# Patient Record
Sex: MALE | Race: WHITE | NOT HISPANIC OR LATINO
[De-identification: names, ages, dates, MRNs, and addresses within clinical notes are randomized per-mention and may not be internally consistent; named-entity substitution may affect disease eponyms.]

---

## 2024-03-28 PROBLEM — Z00.00 ENCOUNTER FOR PREVENTIVE HEALTH EXAMINATION: Status: ACTIVE | Noted: 2024-03-28

## 2024-04-26 PROBLEM — Z87.19 HISTORY OF DIVERTICULITIS OF COLON: Status: RESOLVED | Noted: 2024-04-26 | Resolved: 2024-04-26

## 2024-04-26 PROBLEM — Z86.79 HISTORY OF SUBDURAL HEMORRHAGE: Status: RESOLVED | Noted: 2024-04-26 | Resolved: 2024-04-26

## 2024-04-29 ENCOUNTER — APPOINTMENT (OUTPATIENT)
Dept: COLORECTAL SURGERY | Facility: CLINIC | Age: 69
End: 2024-04-29
Payer: MEDICARE

## 2024-04-29 VITALS
RESPIRATION RATE: 4 BRPM | HEIGHT: 77 IN | BODY MASS INDEX: 25.74 KG/M2 | OXYGEN SATURATION: 100 % | WEIGHT: 218 LBS | DIASTOLIC BLOOD PRESSURE: 86 MMHG | SYSTOLIC BLOOD PRESSURE: 132 MMHG | HEART RATE: 67 BPM

## 2024-04-29 DIAGNOSIS — Z87.19 PERSONAL HISTORY OF OTHER DISEASES OF THE DIGESTIVE SYSTEM: ICD-10-CM

## 2024-04-29 DIAGNOSIS — Z86.79 PERSONAL HISTORY OF OTHER DISEASES OF THE CIRCULATORY SYSTEM: ICD-10-CM

## 2024-04-29 PROCEDURE — 99203 OFFICE O/P NEW LOW 30 MIN: CPT

## 2024-04-29 NOTE — DATA REVIEWED
[FreeTextEntry1] : CT abd/pelvis 3/1/2024 (pg10) s/p left lower quadrant colostomy with creation of a rectosigmoid pouch.  a 2x1 cm thick walled fluid collection, presumably an abscess, posterior to the left abdominal wall has decreased in size, previously 4.2 x 1.1 cm. The previously seen abscess associated with the left inguinal canal extending to the hemiscrotum is no longer present.  Impression: Interval improvement in abscess posterior to the lower left abdominal wall and left inguinal region and hemiscrotum as described.  Pathology 12/16/2023 Portion of colon with diverticulosis and acute serositis c/w perforation

## 2024-04-29 NOTE — PHYSICAL EXAM
[Alert] : alert [Oriented to Person] : oriented to person [Oriented to Place] : oriented to place [Oriented to Time] : oriented to time [de-identified] : large midline incision + LLQ colostomy [de-identified] : WDWN male, NAD [de-identified] : NC/AT [facility] [de-identified] : + left scar; testicle elevated [de-identified] : No C/C/E

## 2024-04-29 NOTE — HISTORY OF PRESENT ILLNESS
[FreeTextEntry1] : 69 y.o male from CT with hx of diverticulitis s/p sigmoid resection on 12/16/2023 with colostomy. Hx of subdural hemorrage 4/2023 (had a fall during pickleball).  He had a b/l inguinal hernia repair; He repeatedly went to the ER. After  He was septic shock and needed emergency surgery.  He did have mesh placed during the inguinal hernia repair. He lost 30lbs during his 49 days in the hospital.

## 2024-04-29 NOTE — ASSESSMENT
[FreeTextEntry1] : 69 y.o male with hx of diverticulitis  we need operative report from prior surgery advised to have clear expectations from urologist - about surgery. Will need ureter stents and possible; plastics consultation due to  5-7 days in the hospital 6-8 weeks recovery epidural for pain management  As stated above this is a complex problem likely injury to the bowel at the time of the laparoscopic hernia repair.  We will  a date that is convenient for him and for us to do the Orozco's takedown with likely diverting loop ileostomy and ureteric stents.  He is really worried about his sexual dysfunction and the scar tissue causing this and the surgery was recommended to him to relieve the scar tissue so the left testicle can go down to the scrotum where previous I&D was done.  This is really up to him since I have not heard this type of approach before at the same time,  I am not an expert about this topic.  I strongly urged him to get an opinion from somebody who deals with erectile dysfunction or infertility related issues as a urologist and will try to find somebody here for that purpose.  Spent over 40 minutes where all risk benefits morbidity complications were explained at the time of the surgery which we plan to do sometime in June or July and he consents for the procedure.  Wife was present and I allowed myself to be recorded at the time of this visit thank you so much

## 2024-06-18 NOTE — DATA REVIEWED
[FreeTextEntry1] : CT abd/pelvis 3/1/2024 (pg10) s/p left lower quadrant colostomy with creation of a rectosigmoid pouch.  a 2x1 cm thick walled fluid collection, presumably an abscess, posterior to the left abdominal wall has decreased in size, previously 4.2 x 1.1 cm. The previously seen abscess associated with the left inguinal canal extending to the hemiscrotum is no longer present.  Impression: Interval improvement in abscess posterior to the lower left abdominal wall and left inguinal region and hemiscrotum as described.  Pathology 12/16/2023 Portion of colon with diverticulosis and acute serositis c/w perforation.

## 2024-06-18 NOTE — PHYSICAL EXAM
[No Rash or Lesion] : No rash or lesion [Alert] : alert [Oriented to Person] : oriented to person [Oriented to Place] : oriented to place [Oriented to Time] : oriented to time [de-identified] : WDWEFE [de-identified] : NC/AT, anicteric [de-identified] : no C/C/E

## 2024-06-19 ENCOUNTER — APPOINTMENT (OUTPATIENT)
Dept: UROLOGY | Facility: CLINIC | Age: 69
End: 2024-06-19
Payer: MEDICARE

## 2024-06-19 VITALS
TEMPERATURE: 97.3 F | HEART RATE: 55 BPM | BODY MASS INDEX: 25.74 KG/M2 | WEIGHT: 218 LBS | SYSTOLIC BLOOD PRESSURE: 128 MMHG | OXYGEN SATURATION: 100 % | RESPIRATION RATE: 14 BRPM | HEIGHT: 77 IN | DIASTOLIC BLOOD PRESSURE: 79 MMHG

## 2024-06-19 DIAGNOSIS — K57.32 DIVERTICULITIS OF LARGE INTESTINE W/OUT PERFORATION OR ABSCESS W/OUT BLEEDING: ICD-10-CM

## 2024-06-19 DIAGNOSIS — K91.858 OTHER COMPLICATIONS OF INTESTINAL POUCH: ICD-10-CM

## 2024-06-19 DIAGNOSIS — Z43.2 ENCOUNTER FOR ATTENTION TO ILEOSTOMY: ICD-10-CM

## 2024-06-19 PROCEDURE — 99203 OFFICE O/P NEW LOW 30 MIN: CPT

## 2024-06-20 ENCOUNTER — APPOINTMENT (OUTPATIENT)
Dept: COLORECTAL SURGERY | Facility: CLINIC | Age: 69
End: 2024-06-20

## 2024-06-24 ENCOUNTER — OUTPATIENT (OUTPATIENT)
Dept: OUTPATIENT SERVICES | Facility: HOSPITAL | Age: 69
LOS: 1 days | End: 2024-06-24
Payer: MEDICARE

## 2024-06-24 ENCOUNTER — APPOINTMENT (OUTPATIENT)
Dept: COLORECTAL SURGERY | Facility: CLINIC | Age: 69
End: 2024-06-24
Payer: MEDICARE

## 2024-06-24 VITALS
RESPIRATION RATE: 14 BRPM | DIASTOLIC BLOOD PRESSURE: 78 MMHG | SYSTOLIC BLOOD PRESSURE: 134 MMHG | OXYGEN SATURATION: 98 % | WEIGHT: 218 LBS | HEIGHT: 77 IN | HEART RATE: 65 BPM | BODY MASS INDEX: 25.74 KG/M2

## 2024-06-24 VITALS
SYSTOLIC BLOOD PRESSURE: 127 MMHG | TEMPERATURE: 98 F | OXYGEN SATURATION: 100 % | HEIGHT: 77 IN | RESPIRATION RATE: 18 BRPM | DIASTOLIC BLOOD PRESSURE: 81 MMHG | HEART RATE: 61 BPM | WEIGHT: 224.65 LBS

## 2024-06-24 DIAGNOSIS — Z29.9 ENCOUNTER FOR PROPHYLACTIC MEASURES, UNSPECIFIED: ICD-10-CM

## 2024-06-24 DIAGNOSIS — Z96.643 PRESENCE OF ARTIFICIAL HIP JOINT, BILATERAL: Chronic | ICD-10-CM

## 2024-06-24 DIAGNOSIS — Z90.49 ACQUIRED ABSENCE OF OTHER SPECIFIED PARTS OF DIGESTIVE TRACT: Chronic | ICD-10-CM

## 2024-06-24 DIAGNOSIS — K91.858 OTHER COMPLICATIONS OF INTESTINAL POUCH: ICD-10-CM

## 2024-06-24 DIAGNOSIS — Z90.89 ACQUIRED ABSENCE OF OTHER ORGANS: Chronic | ICD-10-CM

## 2024-06-24 DIAGNOSIS — Z93.3 COLOSTOMY STATUS: Chronic | ICD-10-CM

## 2024-06-24 DIAGNOSIS — Z98.890 OTHER SPECIFIED POSTPROCEDURAL STATES: Chronic | ICD-10-CM

## 2024-06-24 DIAGNOSIS — Z01.818 ENCOUNTER FOR OTHER PREPROCEDURAL EXAMINATION: ICD-10-CM

## 2024-06-24 LAB
ANION GAP SERPL CALC-SCNC: 11 MMOL/L — SIGNIFICANT CHANGE UP (ref 5–17)
BLD GP AB SCN SERPL QL: NEGATIVE — SIGNIFICANT CHANGE UP
BUN SERPL-MCNC: 20 MG/DL — SIGNIFICANT CHANGE UP (ref 7–23)
CALCIUM SERPL-MCNC: 10.2 MG/DL — SIGNIFICANT CHANGE UP (ref 8.4–10.5)
CHLORIDE SERPL-SCNC: 105 MMOL/L — SIGNIFICANT CHANGE UP (ref 96–108)
CO2 SERPL-SCNC: 24 MMOL/L — SIGNIFICANT CHANGE UP (ref 22–31)
CREAT SERPL-MCNC: 1.22 MG/DL — SIGNIFICANT CHANGE UP (ref 0.5–1.3)
EGFR: 64 ML/MIN/1.73M2 — SIGNIFICANT CHANGE UP
GLUCOSE SERPL-MCNC: 108 MG/DL — HIGH (ref 70–99)
HCT VFR BLD CALC: 42.4 % — SIGNIFICANT CHANGE UP (ref 39–50)
HGB BLD-MCNC: 14 G/DL — SIGNIFICANT CHANGE UP (ref 13–17)
MCHC RBC-ENTMCNC: 30.4 PG — SIGNIFICANT CHANGE UP (ref 27–34)
MCHC RBC-ENTMCNC: 33 GM/DL — SIGNIFICANT CHANGE UP (ref 32–36)
MCV RBC AUTO: 92.2 FL — SIGNIFICANT CHANGE UP (ref 80–100)
NRBC # BLD: 0 /100 WBCS — SIGNIFICANT CHANGE UP (ref 0–0)
PLATELET # BLD AUTO: 247 K/UL — SIGNIFICANT CHANGE UP (ref 150–400)
POTASSIUM SERPL-MCNC: 4.5 MMOL/L — SIGNIFICANT CHANGE UP (ref 3.5–5.3)
POTASSIUM SERPL-SCNC: 4.5 MMOL/L — SIGNIFICANT CHANGE UP (ref 3.5–5.3)
RBC # BLD: 4.6 M/UL — SIGNIFICANT CHANGE UP (ref 4.2–5.8)
RBC # FLD: 12.6 % — SIGNIFICANT CHANGE UP (ref 10.3–14.5)
RH IG SCN BLD-IMP: POSITIVE — SIGNIFICANT CHANGE UP
SODIUM SERPL-SCNC: 140 MMOL/L — SIGNIFICANT CHANGE UP (ref 135–145)
WBC # BLD: 7.51 K/UL — SIGNIFICANT CHANGE UP (ref 3.8–10.5)
WBC # FLD AUTO: 7.51 K/UL — SIGNIFICANT CHANGE UP (ref 3.8–10.5)

## 2024-06-24 PROCEDURE — 99214 OFFICE O/P EST MOD 30 MIN: CPT

## 2024-06-24 RX ORDER — CEFOTETAN DISODIUM 2 G
2 VIAL (EA) INJECTION ONCE
Refills: 0 | Status: DISCONTINUED | OUTPATIENT
Start: 2024-06-25 | End: 2024-06-26

## 2024-06-25 ENCOUNTER — APPOINTMENT (OUTPATIENT)
Dept: UROLOGY | Facility: HOSPITAL | Age: 69
End: 2024-06-25

## 2024-06-25 ENCOUNTER — APPOINTMENT (OUTPATIENT)
Dept: COLORECTAL SURGERY | Facility: HOSPITAL | Age: 69
End: 2024-06-25

## 2024-06-25 ENCOUNTER — INPATIENT (INPATIENT)
Facility: HOSPITAL | Age: 69
LOS: 5 days | Discharge: HOME HEALTH | DRG: 395 | End: 2024-07-01
Attending: COLON & RECTAL SURGERY | Admitting: COLON & RECTAL SURGERY
Payer: MEDICARE

## 2024-06-25 VITALS
TEMPERATURE: 98 F | SYSTOLIC BLOOD PRESSURE: 122 MMHG | OXYGEN SATURATION: 98 % | HEIGHT: 77.01 IN | DIASTOLIC BLOOD PRESSURE: 74 MMHG | WEIGHT: 224.65 LBS | HEART RATE: 68 BPM | RESPIRATION RATE: 16 BRPM

## 2024-06-25 DIAGNOSIS — K91.858 OTHER COMPLICATIONS OF INTESTINAL POUCH: ICD-10-CM

## 2024-06-25 DIAGNOSIS — Z93.3 COLOSTOMY STATUS: Chronic | ICD-10-CM

## 2024-06-25 DIAGNOSIS — Z90.89 ACQUIRED ABSENCE OF OTHER ORGANS: Chronic | ICD-10-CM

## 2024-06-25 DIAGNOSIS — Z90.49 ACQUIRED ABSENCE OF OTHER SPECIFIED PARTS OF DIGESTIVE TRACT: Chronic | ICD-10-CM

## 2024-06-25 DIAGNOSIS — Z98.890 OTHER SPECIFIED POSTPROCEDURAL STATES: Chronic | ICD-10-CM

## 2024-06-25 DIAGNOSIS — Z96.643 PRESENCE OF ARTIFICIAL HIP JOINT, BILATERAL: Chronic | ICD-10-CM

## 2024-06-25 LAB
ADD ON TEST-SPECIMEN IN LAB: SIGNIFICANT CHANGE UP
ANION GAP SERPL CALC-SCNC: 15 MMOL/L — SIGNIFICANT CHANGE UP (ref 5–17)
BUN SERPL-MCNC: 19 MG/DL — SIGNIFICANT CHANGE UP (ref 7–23)
CALCIUM SERPL-MCNC: 9.7 MG/DL — SIGNIFICANT CHANGE UP (ref 8.4–10.5)
CHLORIDE SERPL-SCNC: 105 MMOL/L — SIGNIFICANT CHANGE UP (ref 96–108)
CO2 SERPL-SCNC: 18 MMOL/L — LOW (ref 22–31)
CREAT SERPL-MCNC: 1.11 MG/DL — SIGNIFICANT CHANGE UP (ref 0.5–1.3)
EGFR: 71 ML/MIN/1.73M2 — SIGNIFICANT CHANGE UP
GAS PNL BLDA: SIGNIFICANT CHANGE UP
GLUCOSE SERPL-MCNC: 159 MG/DL — HIGH (ref 70–99)
HCT VFR BLD CALC: 36.3 % — LOW (ref 39–50)
HGB BLD-MCNC: 12.4 G/DL — LOW (ref 13–17)
MAGNESIUM SERPL-MCNC: 1.7 MG/DL — SIGNIFICANT CHANGE UP (ref 1.6–2.6)
MCHC RBC-ENTMCNC: 31 PG — SIGNIFICANT CHANGE UP (ref 27–34)
MCHC RBC-ENTMCNC: 34.2 GM/DL — SIGNIFICANT CHANGE UP (ref 32–36)
MCV RBC AUTO: 90.8 FL — SIGNIFICANT CHANGE UP (ref 80–100)
NRBC # BLD: 0 /100 WBCS — SIGNIFICANT CHANGE UP (ref 0–0)
PHOSPHATE SERPL-MCNC: 4.3 MG/DL — SIGNIFICANT CHANGE UP (ref 2.5–4.5)
PLATELET # BLD AUTO: 212 K/UL — SIGNIFICANT CHANGE UP (ref 150–400)
POTASSIUM SERPL-MCNC: 4 MMOL/L — SIGNIFICANT CHANGE UP (ref 3.5–5.3)
POTASSIUM SERPL-SCNC: 4 MMOL/L — SIGNIFICANT CHANGE UP (ref 3.5–5.3)
RBC # BLD: 4 M/UL — LOW (ref 4.2–5.8)
RBC # FLD: 12.5 % — SIGNIFICANT CHANGE UP (ref 10.3–14.5)
SODIUM SERPL-SCNC: 138 MMOL/L — SIGNIFICANT CHANGE UP (ref 135–145)
WBC # BLD: 12.12 K/UL — HIGH (ref 3.8–10.5)
WBC # FLD AUTO: 12.12 K/UL — HIGH (ref 3.8–10.5)

## 2024-06-25 PROCEDURE — 45330 DIAGNOSTIC SIGMOIDOSCOPY: CPT

## 2024-06-25 PROCEDURE — 44310 ILEOSTOMY/JEJUNOSTOMY: CPT

## 2024-06-25 PROCEDURE — 44626 REPAIR BOWEL OPENING: CPT | Mod: 22

## 2024-06-25 PROCEDURE — 52005 CYSTO W/URTRL CATHJ: CPT

## 2024-06-25 PROCEDURE — 88307 TISSUE EXAM BY PATHOLOGIST: CPT | Mod: 26

## 2024-06-25 DEVICE — GUIDEWIRE SENSOR DUAL-FLEX NITINOL STRAIGHT .035" X 150CM: Type: IMPLANTABLE DEVICE | Status: FUNCTIONAL

## 2024-06-25 DEVICE — STAPLER COVIDIEN TA 60 GREEN: Type: IMPLANTABLE DEVICE | Status: FUNCTIONAL

## 2024-06-25 DEVICE — URETERAL CATH FLEXIMA OPEN END 6FR 70CM: Type: IMPLANTABLE DEVICE | Status: FUNCTIONAL

## 2024-06-25 DEVICE — STAPLER ECHELON CIRCULAR POWERED 29MM: Type: IMPLANTABLE DEVICE | Status: FUNCTIONAL

## 2024-06-25 RX ORDER — ACETAMINOPHEN 325 MG
1000 TABLET ORAL EVERY 6 HOURS
Refills: 0 | Status: COMPLETED | OUTPATIENT
Start: 2024-06-25 | End: 2024-06-26

## 2024-06-25 RX ORDER — DEXTROSE MONOHYDRATE AND SODIUM CHLORIDE 5; .3 G/100ML; G/100ML
1000 INJECTION, SOLUTION INTRAVENOUS
Refills: 0 | Status: DISCONTINUED | OUTPATIENT
Start: 2024-06-25 | End: 2024-07-01

## 2024-06-25 RX ORDER — HYDROMORPHONE HCL 0.2 MG/ML
250 INJECTION, SOLUTION INTRAVENOUS
Refills: 0 | Status: DISCONTINUED | OUTPATIENT
Start: 2024-06-25 | End: 2024-06-25

## 2024-06-25 RX ORDER — ATORVASTATIN CALCIUM 20 MG/1
1 TABLET, FILM COATED ORAL
Refills: 0 | DISCHARGE

## 2024-06-25 RX ORDER — SODIUM CHLORIDE 0.9 % (FLUSH) 0.9 %
3 SYRINGE (ML) INJECTION EVERY 8 HOURS
Refills: 0 | Status: DISCONTINUED | OUTPATIENT
Start: 2024-06-25 | End: 2024-06-25

## 2024-06-25 RX ORDER — HYDROMORPHONE HCL 0.2 MG/ML
250 INJECTION, SOLUTION INTRAVENOUS
Refills: 0 | Status: DISCONTINUED | OUTPATIENT
Start: 2024-06-25 | End: 2024-06-30

## 2024-06-25 RX ORDER — ONDANSETRON HYDROCHLORIDE 2 MG/ML
4 INJECTION INTRAMUSCULAR; INTRAVENOUS EVERY 6 HOURS
Refills: 0 | Status: DISCONTINUED | OUTPATIENT
Start: 2024-06-25 | End: 2024-07-01

## 2024-06-25 RX ORDER — KETOROLAC TROMETHAMINE 30 MG/ML
30 INJECTION, SOLUTION INTRAMUSCULAR EVERY 8 HOURS
Refills: 0 | Status: DISCONTINUED | OUTPATIENT
Start: 2024-06-25 | End: 2024-06-25

## 2024-06-25 RX ORDER — NALOXONE HYDROCHLORIDE 1 MG/ML
0.1 INJECTION PARENTERAL
Refills: 0 | Status: DISCONTINUED | OUTPATIENT
Start: 2024-06-25 | End: 2024-06-30

## 2024-06-25 RX ORDER — LORAZEPAM 0.5 MG
1 TABLET ORAL
Refills: 0 | DISCHARGE

## 2024-06-25 RX ORDER — NALBUPHINE HCL 100 %
2.5 POWDER (GRAM) MISCELLANEOUS EVERY 6 HOURS
Refills: 0 | Status: DISCONTINUED | OUTPATIENT
Start: 2024-06-25 | End: 2024-06-30

## 2024-06-25 RX ORDER — HEPARIN SODIUM 50 [USP'U]/ML
5000 INJECTION, SOLUTION INTRAVENOUS EVERY 8 HOURS
Refills: 0 | Status: DISCONTINUED | OUTPATIENT
Start: 2024-06-25 | End: 2024-07-01

## 2024-06-25 RX ORDER — ZOLPIDEM TARTRATE 10 MG
1 TABLET ORAL
Refills: 0 | DISCHARGE

## 2024-06-25 RX ORDER — KETOROLAC TROMETHAMINE 30 MG/ML
15 INJECTION, SOLUTION INTRAMUSCULAR EVERY 6 HOURS
Refills: 0 | Status: DISCONTINUED | OUTPATIENT
Start: 2024-06-25 | End: 2024-06-26

## 2024-06-25 RX ORDER — PANTOPRAZOLE SODIUM 40 MG/10ML
40 INJECTION, POWDER, FOR SOLUTION INTRAVENOUS ONCE
Refills: 0 | Status: COMPLETED | OUTPATIENT
Start: 2024-06-25 | End: 2024-06-25

## 2024-06-25 RX ORDER — LIDOCAINE HYDROCHLORIDE 20 MG/ML
0.2 INJECTION, SOLUTION EPIDURAL; INFILTRATION; INTRACAUDAL; PERINEURAL ONCE
Refills: 0 | Status: DISCONTINUED | OUTPATIENT
Start: 2024-06-25 | End: 2024-06-25

## 2024-06-25 RX ORDER — HYDROMORPHONE HCL 0.2 MG/ML
5 INJECTION, SOLUTION INTRAVENOUS
Refills: 0 | Status: DISCONTINUED | OUTPATIENT
Start: 2024-06-25 | End: 2024-06-30

## 2024-06-25 RX ADMIN — HYDROMORPHONE HCL 5 MILLILITER(S): 0.2 INJECTION, SOLUTION INTRAVENOUS at 16:25

## 2024-06-25 RX ADMIN — DEXTROSE MONOHYDRATE AND SODIUM CHLORIDE 100 MILLILITER(S): 5; .3 INJECTION, SOLUTION INTRAVENOUS at 22:29

## 2024-06-25 RX ADMIN — PANTOPRAZOLE SODIUM 40 MILLIGRAM(S): 40 INJECTION, POWDER, FOR SOLUTION INTRAVENOUS at 16:52

## 2024-06-25 RX ADMIN — DEXTROSE MONOHYDRATE AND SODIUM CHLORIDE 100 MILLILITER(S): 5; .3 INJECTION, SOLUTION INTRAVENOUS at 16:15

## 2024-06-25 RX ADMIN — Medication 1000 MILLIGRAM(S): at 18:55

## 2024-06-25 RX ADMIN — HYDROMORPHONE HCL 5 MILLILITER(S): 0.2 INJECTION, SOLUTION INTRAVENOUS at 17:55

## 2024-06-25 RX ADMIN — HEPARIN SODIUM 5000 UNIT(S): 50 INJECTION, SOLUTION INTRAVENOUS at 22:29

## 2024-06-25 RX ADMIN — HYDROMORPHONE HCL 250 MILLILITER(S): 0.2 INJECTION, SOLUTION INTRAVENOUS at 19:16

## 2024-06-25 RX ADMIN — HYDROMORPHONE HCL 250 MILLILITER(S): 0.2 INJECTION, SOLUTION INTRAVENOUS at 18:00

## 2024-06-25 RX ADMIN — Medication 400 MILLIGRAM(S): at 18:25

## 2024-06-25 RX ADMIN — HYDROMORPHONE HCL 250 MILLILITER(S): 0.2 INJECTION, SOLUTION INTRAVENOUS at 19:46

## 2024-06-25 RX ADMIN — HYDROMORPHONE HCL 250 MILLILITER(S): 0.2 INJECTION, SOLUTION INTRAVENOUS at 15:15

## 2024-06-25 RX ADMIN — HYDROMORPHONE HCL 250 MILLILITER(S): 0.2 INJECTION, SOLUTION INTRAVENOUS at 20:51

## 2024-06-26 LAB
ANION GAP SERPL CALC-SCNC: 10 MMOL/L — SIGNIFICANT CHANGE UP (ref 5–17)
BUN SERPL-MCNC: 22 MG/DL — SIGNIFICANT CHANGE UP (ref 7–23)
CALCIUM SERPL-MCNC: 9.2 MG/DL — SIGNIFICANT CHANGE UP (ref 8.4–10.5)
CHLORIDE SERPL-SCNC: 102 MMOL/L — SIGNIFICANT CHANGE UP (ref 96–108)
CO2 SERPL-SCNC: 23 MMOL/L — SIGNIFICANT CHANGE UP (ref 22–31)
CREAT SERPL-MCNC: 1.25 MG/DL — SIGNIFICANT CHANGE UP (ref 0.5–1.3)
EGFR: 62 ML/MIN/1.73M2 — SIGNIFICANT CHANGE UP
GLUCOSE SERPL-MCNC: 126 MG/DL — HIGH (ref 70–99)
HCT VFR BLD CALC: 35.9 % — LOW (ref 39–50)
HGB BLD-MCNC: 12.4 G/DL — LOW (ref 13–17)
MAGNESIUM SERPL-MCNC: 1.8 MG/DL — SIGNIFICANT CHANGE UP (ref 1.6–2.6)
MCHC RBC-ENTMCNC: 31.3 PG — SIGNIFICANT CHANGE UP (ref 27–34)
MCHC RBC-ENTMCNC: 34.5 GM/DL — SIGNIFICANT CHANGE UP (ref 32–36)
MCV RBC AUTO: 90.7 FL — SIGNIFICANT CHANGE UP (ref 80–100)
NRBC # BLD: 0 /100 WBCS — SIGNIFICANT CHANGE UP (ref 0–0)
PHOSPHATE SERPL-MCNC: 3.6 MG/DL — SIGNIFICANT CHANGE UP (ref 2.5–4.5)
PLATELET # BLD AUTO: 206 K/UL — SIGNIFICANT CHANGE UP (ref 150–400)
POTASSIUM SERPL-MCNC: 5.1 MMOL/L — SIGNIFICANT CHANGE UP (ref 3.5–5.3)
POTASSIUM SERPL-SCNC: 5.1 MMOL/L — SIGNIFICANT CHANGE UP (ref 3.5–5.3)
RBC # BLD: 3.96 M/UL — LOW (ref 4.2–5.8)
RBC # FLD: 12.7 % — SIGNIFICANT CHANGE UP (ref 10.3–14.5)
SODIUM SERPL-SCNC: 135 MMOL/L — SIGNIFICANT CHANGE UP (ref 135–145)
WBC # BLD: 14.2 K/UL — HIGH (ref 3.8–10.5)
WBC # FLD AUTO: 14.2 K/UL — HIGH (ref 3.8–10.5)

## 2024-06-26 RX ORDER — KETOROLAC TROMETHAMINE 30 MG/ML
15 INJECTION, SOLUTION INTRAMUSCULAR EVERY 6 HOURS
Refills: 0 | Status: DISCONTINUED | OUTPATIENT
Start: 2024-06-26 | End: 2024-06-27

## 2024-06-26 RX ORDER — PIPERACILLIN SODIUM AND TAZOBACTAM SODIUM 3; .375 G/15ML; G/15ML
3.38 INJECTION, POWDER, LYOPHILIZED, FOR SOLUTION INTRAVENOUS ONCE
Refills: 0 | Status: COMPLETED | OUTPATIENT
Start: 2024-06-26 | End: 2024-06-26

## 2024-06-26 RX ORDER — CEFTRIAXONE SODIUM 500 MG
1000 VIAL (EA) INJECTION EVERY 24 HOURS
Refills: 0 | Status: DISCONTINUED | OUTPATIENT
Start: 2024-06-26 | End: 2024-06-26

## 2024-06-26 RX ORDER — METHOCARBAMOL 500 MG
1000 TABLET ORAL EVERY 8 HOURS
Refills: 0 | Status: COMPLETED | OUTPATIENT
Start: 2024-06-26 | End: 2024-06-29

## 2024-06-26 RX ORDER — PIPERACILLIN SODIUM AND TAZOBACTAM SODIUM 3; .375 G/15ML; G/15ML
3.38 INJECTION, POWDER, LYOPHILIZED, FOR SOLUTION INTRAVENOUS EVERY 8 HOURS
Refills: 0 | Status: COMPLETED | OUTPATIENT
Start: 2024-06-26 | End: 2024-06-29

## 2024-06-26 RX ORDER — ZOLPIDEM TARTRATE 10 MG
5 TABLET ORAL AT BEDTIME
Refills: 0 | Status: DISCONTINUED | OUTPATIENT
Start: 2024-06-26 | End: 2024-07-01

## 2024-06-26 RX ORDER — ACETAMINOPHEN 325 MG
1000 TABLET ORAL EVERY 6 HOURS
Refills: 0 | Status: COMPLETED | OUTPATIENT
Start: 2024-06-26 | End: 2024-06-27

## 2024-06-26 RX ADMIN — HYDROMORPHONE HCL 250 MILLILITER(S): 0.2 INJECTION, SOLUTION INTRAVENOUS at 20:53

## 2024-06-26 RX ADMIN — Medication 1000 MILLIGRAM(S): at 06:40

## 2024-06-26 RX ADMIN — KETOROLAC TROMETHAMINE 15 MILLIGRAM(S): 30 INJECTION, SOLUTION INTRAMUSCULAR at 19:48

## 2024-06-26 RX ADMIN — KETOROLAC TROMETHAMINE 15 MILLIGRAM(S): 30 INJECTION, SOLUTION INTRAMUSCULAR at 13:37

## 2024-06-26 RX ADMIN — HYDROMORPHONE HCL 250 MILLILITER(S): 0.2 INJECTION, SOLUTION INTRAVENOUS at 07:40

## 2024-06-26 RX ADMIN — DEXTROSE MONOHYDRATE AND SODIUM CHLORIDE 100 MILLILITER(S): 5; .3 INJECTION, SOLUTION INTRAVENOUS at 10:16

## 2024-06-26 RX ADMIN — Medication 1000 MILLIGRAM(S): at 02:30

## 2024-06-26 RX ADMIN — HEPARIN SODIUM 5000 UNIT(S): 50 INJECTION, SOLUTION INTRAVENOUS at 21:42

## 2024-06-26 RX ADMIN — HEPARIN SODIUM 5000 UNIT(S): 50 INJECTION, SOLUTION INTRAVENOUS at 06:00

## 2024-06-26 RX ADMIN — PIPERACILLIN SODIUM AND TAZOBACTAM SODIUM 25 GRAM(S): 3; .375 INJECTION, POWDER, LYOPHILIZED, FOR SOLUTION INTRAVENOUS at 21:42

## 2024-06-26 RX ADMIN — HYDROMORPHONE HCL 250 MILLILITER(S): 0.2 INJECTION, SOLUTION INTRAVENOUS at 16:02

## 2024-06-26 RX ADMIN — Medication 1000 MILLIGRAM(S): at 12:40

## 2024-06-26 RX ADMIN — Medication 400 MILLIGRAM(S): at 12:10

## 2024-06-26 RX ADMIN — PIPERACILLIN SODIUM AND TAZOBACTAM SODIUM 25 GRAM(S): 3; .375 INJECTION, POWDER, LYOPHILIZED, FOR SOLUTION INTRAVENOUS at 13:20

## 2024-06-26 RX ADMIN — HEPARIN SODIUM 5000 UNIT(S): 50 INJECTION, SOLUTION INTRAVENOUS at 13:19

## 2024-06-26 RX ADMIN — Medication 400 MILLIGRAM(S): at 01:34

## 2024-06-26 RX ADMIN — Medication 5 MILLIGRAM(S): at 23:04

## 2024-06-26 RX ADMIN — Medication 220 MILLIGRAM(S): at 21:42

## 2024-06-26 RX ADMIN — KETOROLAC TROMETHAMINE 15 MILLIGRAM(S): 30 INJECTION, SOLUTION INTRAMUSCULAR at 20:18

## 2024-06-26 RX ADMIN — Medication 400 MILLIGRAM(S): at 23:04

## 2024-06-26 RX ADMIN — Medication 400 MILLIGRAM(S): at 06:01

## 2024-06-26 RX ADMIN — HYDROMORPHONE HCL 250 MILLILITER(S): 0.2 INJECTION, SOLUTION INTRAVENOUS at 19:41

## 2024-06-26 RX ADMIN — Medication 1000 MILLIGRAM(S): at 23:34

## 2024-06-26 RX ADMIN — PIPERACILLIN SODIUM AND TAZOBACTAM SODIUM 200 GRAM(S): 3; .375 INJECTION, POWDER, LYOPHILIZED, FOR SOLUTION INTRAVENOUS at 10:16

## 2024-06-26 RX ADMIN — KETOROLAC TROMETHAMINE 15 MILLIGRAM(S): 30 INJECTION, SOLUTION INTRAMUSCULAR at 14:07

## 2024-06-27 PROBLEM — K91.858 COMPLICATIONS OF INTESTINAL POUCH: Status: ACTIVE | Noted: 2024-04-26

## 2024-06-27 PROBLEM — Z43.2 ATTENTION TO ILEOSTOMY: Status: ACTIVE | Noted: 2024-06-24

## 2024-06-27 PROBLEM — K57.32 DIVERTICULITIS, COLON: Status: ACTIVE | Noted: 2024-04-26

## 2024-06-27 LAB
-  AMPICILLIN/SULBACTAM: SIGNIFICANT CHANGE UP
-  AMPICILLIN: SIGNIFICANT CHANGE UP
-  AZTREONAM: SIGNIFICANT CHANGE UP
-  CEFAZOLIN: SIGNIFICANT CHANGE UP
-  CEFEPIME: SIGNIFICANT CHANGE UP
-  CEFTRIAXONE: SIGNIFICANT CHANGE UP
-  CEFUROXIME: SIGNIFICANT CHANGE UP
-  CIPROFLOXACIN: SIGNIFICANT CHANGE UP
-  ERTAPENEM: SIGNIFICANT CHANGE UP
-  GENTAMICIN: SIGNIFICANT CHANGE UP
-  IMIPENEM: SIGNIFICANT CHANGE UP
-  LEVOFLOXACIN: SIGNIFICANT CHANGE UP
-  MEROPENEM: SIGNIFICANT CHANGE UP
-  NITROFURANTOIN: SIGNIFICANT CHANGE UP
-  PIPERACILLIN/TAZOBACTAM: SIGNIFICANT CHANGE UP
-  TOBRAMYCIN: SIGNIFICANT CHANGE UP
-  TRIMETHOPRIM/SULFAMETHOXAZOLE: SIGNIFICANT CHANGE UP
ANION GAP SERPL CALC-SCNC: 11 MMOL/L — SIGNIFICANT CHANGE UP (ref 5–17)
ANION GAP SERPL CALC-SCNC: 11 MMOL/L — SIGNIFICANT CHANGE UP (ref 5–17)
ANION GAP SERPL CALC-SCNC: 13 MMOL/L — SIGNIFICANT CHANGE UP (ref 5–17)
BUN SERPL-MCNC: 27 MG/DL — HIGH (ref 7–23)
BUN SERPL-MCNC: 28 MG/DL — HIGH (ref 7–23)
BUN SERPL-MCNC: 30 MG/DL — HIGH (ref 7–23)
CALCIUM SERPL-MCNC: 8.5 MG/DL — SIGNIFICANT CHANGE UP (ref 8.4–10.5)
CALCIUM SERPL-MCNC: 8.6 MG/DL — SIGNIFICANT CHANGE UP (ref 8.4–10.5)
CALCIUM SERPL-MCNC: 8.6 MG/DL — SIGNIFICANT CHANGE UP (ref 8.4–10.5)
CHLORIDE SERPL-SCNC: 102 MMOL/L — SIGNIFICANT CHANGE UP (ref 96–108)
CHLORIDE SERPL-SCNC: 103 MMOL/L — SIGNIFICANT CHANGE UP (ref 96–108)
CHLORIDE SERPL-SCNC: 104 MMOL/L — SIGNIFICANT CHANGE UP (ref 96–108)
CO2 SERPL-SCNC: 21 MMOL/L — LOW (ref 22–31)
CO2 SERPL-SCNC: 21 MMOL/L — LOW (ref 22–31)
CO2 SERPL-SCNC: 22 MMOL/L — SIGNIFICANT CHANGE UP (ref 22–31)
CREAT ?TM UR-MCNC: 66 MG/DL — SIGNIFICANT CHANGE UP
CREAT SERPL-MCNC: 2.3 MG/DL — HIGH (ref 0.5–1.3)
CREAT SERPL-MCNC: 2.62 MG/DL — HIGH (ref 0.5–1.3)
CREAT SERPL-MCNC: 2.77 MG/DL — HIGH (ref 0.5–1.3)
CULTURE RESULTS: ABNORMAL
EGFR: 24 ML/MIN/1.73M2 — LOW
EGFR: 25 ML/MIN/1.73M2 — LOW
EGFR: 30 ML/MIN/1.73M2 — LOW
GLUCOSE SERPL-MCNC: 122 MG/DL — HIGH (ref 70–99)
GLUCOSE SERPL-MCNC: 124 MG/DL — HIGH (ref 70–99)
GLUCOSE SERPL-MCNC: 135 MG/DL — HIGH (ref 70–99)
HCT VFR BLD CALC: 27.9 % — LOW (ref 39–50)
HCT VFR BLD CALC: 28.7 % — LOW (ref 39–50)
HGB BLD-MCNC: 9.7 G/DL — LOW (ref 13–17)
HGB BLD-MCNC: 9.8 G/DL — LOW (ref 13–17)
MAGNESIUM SERPL-MCNC: 1.7 MG/DL — SIGNIFICANT CHANGE UP (ref 1.6–2.6)
MCHC RBC-ENTMCNC: 31.2 PG — SIGNIFICANT CHANGE UP (ref 27–34)
MCHC RBC-ENTMCNC: 31.6 PG — SIGNIFICANT CHANGE UP (ref 27–34)
MCHC RBC-ENTMCNC: 33.8 GM/DL — SIGNIFICANT CHANGE UP (ref 32–36)
MCHC RBC-ENTMCNC: 35.1 GM/DL — SIGNIFICANT CHANGE UP (ref 32–36)
MCV RBC AUTO: 90 FL — SIGNIFICANT CHANGE UP (ref 80–100)
MCV RBC AUTO: 92.3 FL — SIGNIFICANT CHANGE UP (ref 80–100)
METHOD TYPE: SIGNIFICANT CHANGE UP
NRBC # BLD: 0 /100 WBCS — SIGNIFICANT CHANGE UP (ref 0–0)
NRBC # BLD: 0 /100 WBCS — SIGNIFICANT CHANGE UP (ref 0–0)
ORGANISM # SPEC MICROSCOPIC CNT: ABNORMAL
ORGANISM # SPEC MICROSCOPIC CNT: ABNORMAL
PHOSPHATE SERPL-MCNC: 2.3 MG/DL — LOW (ref 2.5–4.5)
PLATELET # BLD AUTO: 150 K/UL — SIGNIFICANT CHANGE UP (ref 150–400)
PLATELET # BLD AUTO: 160 K/UL — SIGNIFICANT CHANGE UP (ref 150–400)
POTASSIUM SERPL-MCNC: 3.9 MMOL/L — SIGNIFICANT CHANGE UP (ref 3.5–5.3)
POTASSIUM SERPL-MCNC: 4.3 MMOL/L — SIGNIFICANT CHANGE UP (ref 3.5–5.3)
POTASSIUM SERPL-MCNC: 5.9 MMOL/L — HIGH (ref 3.5–5.3)
POTASSIUM SERPL-SCNC: 3.9 MMOL/L — SIGNIFICANT CHANGE UP (ref 3.5–5.3)
POTASSIUM SERPL-SCNC: 4.3 MMOL/L — SIGNIFICANT CHANGE UP (ref 3.5–5.3)
POTASSIUM SERPL-SCNC: 5.9 MMOL/L — HIGH (ref 3.5–5.3)
RBC # BLD: 3.1 M/UL — LOW (ref 4.2–5.8)
RBC # BLD: 3.11 M/UL — LOW (ref 4.2–5.8)
RBC # FLD: 12.6 % — SIGNIFICANT CHANGE UP (ref 10.3–14.5)
RBC # FLD: 12.7 % — SIGNIFICANT CHANGE UP (ref 10.3–14.5)
SODIUM SERPL-SCNC: 134 MMOL/L — LOW (ref 135–145)
SODIUM SERPL-SCNC: 137 MMOL/L — SIGNIFICANT CHANGE UP (ref 135–145)
SODIUM SERPL-SCNC: 137 MMOL/L — SIGNIFICANT CHANGE UP (ref 135–145)
SODIUM UR-SCNC: 47 MMOL/L — SIGNIFICANT CHANGE UP
SPECIMEN SOURCE: SIGNIFICANT CHANGE UP
WBC # BLD: 10.34 K/UL — SIGNIFICANT CHANGE UP (ref 3.8–10.5)
WBC # BLD: 10.44 K/UL — SIGNIFICANT CHANGE UP (ref 3.8–10.5)
WBC # FLD AUTO: 10.34 K/UL — SIGNIFICANT CHANGE UP (ref 3.8–10.5)
WBC # FLD AUTO: 10.44 K/UL — SIGNIFICANT CHANGE UP (ref 3.8–10.5)

## 2024-06-27 PROCEDURE — 76770 US EXAM ABDO BACK WALL COMP: CPT | Mod: 26

## 2024-06-27 RX ORDER — DEXTROSE 30 % IN WATER 30 %
50 VIAL (ML) INTRAVENOUS ONCE
Refills: 0 | Status: DISCONTINUED | OUTPATIENT
Start: 2024-06-27 | End: 2024-06-27

## 2024-06-27 RX ORDER — CALCIUM GLUCONATE 98 MG/ML
2 INJECTION, SOLUTION INTRAVENOUS ONCE
Refills: 0 | Status: DISCONTINUED | OUTPATIENT
Start: 2024-06-27 | End: 2024-06-27

## 2024-06-27 RX ORDER — ACETAMINOPHEN 325 MG
1000 TABLET ORAL EVERY 6 HOURS
Refills: 0 | Status: COMPLETED | OUTPATIENT
Start: 2024-06-27 | End: 2024-06-28

## 2024-06-27 RX ORDER — INSULIN REGULAR, HUMAN 100/ML
10 VIAL (ML) INJECTION ONCE
Refills: 0 | Status: DISCONTINUED | OUTPATIENT
Start: 2024-06-27 | End: 2024-06-27

## 2024-06-27 RX ORDER — POTASSIUM PHOSPHATE, MONOBASIC POTASSIUM PHOSPHATE, DIBASIC INJECTION, 236; 224 MG/ML; MG/ML
30 SOLUTION, CONCENTRATE INTRAVENOUS ONCE
Refills: 0 | Status: COMPLETED | OUTPATIENT
Start: 2024-06-27 | End: 2024-06-27

## 2024-06-27 RX ADMIN — Medication 220 MILLIGRAM(S): at 06:20

## 2024-06-27 RX ADMIN — PIPERACILLIN SODIUM AND TAZOBACTAM SODIUM 25 GRAM(S): 3; .375 INJECTION, POWDER, LYOPHILIZED, FOR SOLUTION INTRAVENOUS at 06:20

## 2024-06-27 RX ADMIN — Medication 400 MILLIGRAM(S): at 21:33

## 2024-06-27 RX ADMIN — HEPARIN SODIUM 5000 UNIT(S): 50 INJECTION, SOLUTION INTRAVENOUS at 06:20

## 2024-06-27 RX ADMIN — PIPERACILLIN SODIUM AND TAZOBACTAM SODIUM 25 GRAM(S): 3; .375 INJECTION, POWDER, LYOPHILIZED, FOR SOLUTION INTRAVENOUS at 15:07

## 2024-06-27 RX ADMIN — Medication 400 MILLIGRAM(S): at 06:20

## 2024-06-27 RX ADMIN — PIPERACILLIN SODIUM AND TAZOBACTAM SODIUM 25 GRAM(S): 3; .375 INJECTION, POWDER, LYOPHILIZED, FOR SOLUTION INTRAVENOUS at 21:34

## 2024-06-27 RX ADMIN — HEPARIN SODIUM 5000 UNIT(S): 50 INJECTION, SOLUTION INTRAVENOUS at 21:33

## 2024-06-27 RX ADMIN — KETOROLAC TROMETHAMINE 15 MILLIGRAM(S): 30 INJECTION, SOLUTION INTRAMUSCULAR at 09:53

## 2024-06-27 RX ADMIN — Medication 400 MILLIGRAM(S): at 12:25

## 2024-06-27 RX ADMIN — HYDROMORPHONE HCL 250 MILLILITER(S): 0.2 INJECTION, SOLUTION INTRAVENOUS at 07:47

## 2024-06-27 RX ADMIN — Medication 1000 MILLIGRAM(S): at 19:23

## 2024-06-27 RX ADMIN — HYDROMORPHONE HCL 250 MILLILITER(S): 0.2 INJECTION, SOLUTION INTRAVENOUS at 23:19

## 2024-06-27 RX ADMIN — HEPARIN SODIUM 5000 UNIT(S): 50 INJECTION, SOLUTION INTRAVENOUS at 15:07

## 2024-06-27 RX ADMIN — Medication 1000 MILLIGRAM(S): at 13:20

## 2024-06-27 RX ADMIN — KETOROLAC TROMETHAMINE 15 MILLIGRAM(S): 30 INJECTION, SOLUTION INTRAMUSCULAR at 01:54

## 2024-06-27 RX ADMIN — Medication 220 MILLIGRAM(S): at 21:34

## 2024-06-27 RX ADMIN — Medication 400 MILLIGRAM(S): at 18:28

## 2024-06-27 RX ADMIN — HYDROMORPHONE HCL 250 MILLILITER(S): 0.2 INJECTION, SOLUTION INTRAVENOUS at 19:21

## 2024-06-27 RX ADMIN — KETOROLAC TROMETHAMINE 15 MILLIGRAM(S): 30 INJECTION, SOLUTION INTRAMUSCULAR at 10:30

## 2024-06-27 RX ADMIN — Medication 5 MILLIGRAM(S): at 21:34

## 2024-06-27 RX ADMIN — POTASSIUM PHOSPHATE, MONOBASIC POTASSIUM PHOSPHATE, DIBASIC INJECTION, 83.33 MILLIMOLE(S): 236; 224 SOLUTION, CONCENTRATE INTRAVENOUS at 12:43

## 2024-06-27 RX ADMIN — KETOROLAC TROMETHAMINE 15 MILLIGRAM(S): 30 INJECTION, SOLUTION INTRAMUSCULAR at 01:24

## 2024-06-27 RX ADMIN — Medication 220 MILLIGRAM(S): at 15:05

## 2024-06-27 RX ADMIN — Medication 1000 MILLIGRAM(S): at 22:03

## 2024-06-27 RX ADMIN — Medication 1000 MILLIGRAM(S): at 06:50

## 2024-06-27 NOTE — HISTORY OF PRESENT ILLNESS
[FreeTextEntry1] : had sepsis and colon leak the day after hernia repair laparoscopic  was told he had diverticulitis and got diversion.

## 2024-06-27 NOTE — PHYSICAL EXAM
[TextEntry] : nad ncat abd nd nt  von meatus circ phallus  scrotal scarring wiht ifex left testis high is scrotum no masses.

## 2024-06-27 NOTE — ASSESSMENT
[FreeTextEntry1] : I have ordered a urine culture to evaluate for bacteria prior to the procedure.  The patient reports his intent to forego this or wait till Monday to collect I did advise that Monday would not be sufficient time for evaluating for bacteria prior to his procedure and that by checking urine beforehand we could mitigate potential risk of infection by identifying bacteria prospectively the patient expresses understanding and is current desire to forego urine testing prior to the procedure.  I will provide a specimen cup and order should he change his mind in the interval.  reviewd CRS notes

## 2024-06-28 PROBLEM — G47.00 INSOMNIA, UNSPECIFIED: Chronic | Status: ACTIVE | Noted: 2024-06-24

## 2024-06-28 PROBLEM — F41.9 ANXIETY DISORDER, UNSPECIFIED: Chronic | Status: ACTIVE | Noted: 2024-06-24

## 2024-06-28 PROBLEM — E78.5 HYPERLIPIDEMIA, UNSPECIFIED: Chronic | Status: ACTIVE | Noted: 2024-06-24

## 2024-06-28 PROBLEM — K63.1 PERFORATION OF INTESTINE (NONTRAUMATIC): Chronic | Status: ACTIVE | Noted: 2024-06-24

## 2024-06-28 LAB
ANION GAP SERPL CALC-SCNC: 11 MMOL/L — SIGNIFICANT CHANGE UP (ref 5–17)
APPEARANCE UR: CLEAR — SIGNIFICANT CHANGE UP
BACTERIA # UR AUTO: NEGATIVE /HPF — SIGNIFICANT CHANGE UP
BILIRUB UR-MCNC: NEGATIVE — SIGNIFICANT CHANGE UP
BUN SERPL-MCNC: 26 MG/DL — HIGH (ref 7–23)
CALCIUM SERPL-MCNC: 8.8 MG/DL — SIGNIFICANT CHANGE UP (ref 8.4–10.5)
CAST: 0 /LPF — SIGNIFICANT CHANGE UP (ref 0–4)
CHLORIDE SERPL-SCNC: 105 MMOL/L — SIGNIFICANT CHANGE UP (ref 96–108)
CO2 SERPL-SCNC: 21 MMOL/L — LOW (ref 22–31)
COLOR SPEC: ABNORMAL
CREAT ?TM UR-MCNC: 51 MG/DL — SIGNIFICANT CHANGE UP
CREAT FLD-MCNC: 2.6 MG/DL — SIGNIFICANT CHANGE UP
CREAT FLD-MCNC: 2.82 MG/DL — SIGNIFICANT CHANGE UP
CREAT SERPL-MCNC: 2.83 MG/DL — HIGH (ref 0.5–1.3)
DIFF PNL FLD: ABNORMAL
EGFR: 23 ML/MIN/1.73M2 — LOW
GLUCOSE SERPL-MCNC: 112 MG/DL — HIGH (ref 70–99)
GLUCOSE UR QL: NEGATIVE MG/DL — SIGNIFICANT CHANGE UP
HCT VFR BLD CALC: 28.2 % — LOW (ref 39–50)
HGB BLD-MCNC: 9.4 G/DL — LOW (ref 13–17)
KETONES UR-MCNC: NEGATIVE MG/DL — SIGNIFICANT CHANGE UP
LEUKOCYTE ESTERASE UR-ACNC: NEGATIVE — SIGNIFICANT CHANGE UP
MAGNESIUM SERPL-MCNC: 1.6 MG/DL — SIGNIFICANT CHANGE UP (ref 1.6–2.6)
MCHC RBC-ENTMCNC: 31.2 PG — SIGNIFICANT CHANGE UP (ref 27–34)
MCHC RBC-ENTMCNC: 33.3 GM/DL — SIGNIFICANT CHANGE UP (ref 32–36)
MCV RBC AUTO: 93.7 FL — SIGNIFICANT CHANGE UP (ref 80–100)
NITRITE UR-MCNC: NEGATIVE — SIGNIFICANT CHANGE UP
NRBC # BLD: 0 /100 WBCS — SIGNIFICANT CHANGE UP (ref 0–0)
OSMOLALITY UR: 342 MOS/KG — SIGNIFICANT CHANGE UP (ref 300–900)
PH UR: 5.5 — SIGNIFICANT CHANGE UP (ref 5–8)
PHOSPHATE SERPL-MCNC: 3 MG/DL — SIGNIFICANT CHANGE UP (ref 2.5–4.5)
PLATELET # BLD AUTO: 155 K/UL — SIGNIFICANT CHANGE UP (ref 150–400)
POTASSIUM SERPL-MCNC: 4.1 MMOL/L — SIGNIFICANT CHANGE UP (ref 3.5–5.3)
POTASSIUM SERPL-SCNC: 4.1 MMOL/L — SIGNIFICANT CHANGE UP (ref 3.5–5.3)
POTASSIUM UR-SCNC: 12 MMOL/L — SIGNIFICANT CHANGE UP
PROT ?TM UR-MCNC: 10 MG/DL — SIGNIFICANT CHANGE UP (ref 0–12)
PROT UR-MCNC: SIGNIFICANT CHANGE UP MG/DL
PROT/CREAT UR-RTO: 0.2 RATIO — SIGNIFICANT CHANGE UP (ref 0–0.2)
RBC # BLD: 3.01 M/UL — LOW (ref 4.2–5.8)
RBC # FLD: 12.8 % — SIGNIFICANT CHANGE UP (ref 10.3–14.5)
RBC CASTS # UR COMP ASSIST: 446 /HPF — HIGH (ref 0–4)
SODIUM SERPL-SCNC: 137 MMOL/L — SIGNIFICANT CHANGE UP (ref 135–145)
SODIUM UR-SCNC: 91 MMOL/L — SIGNIFICANT CHANGE UP
SP GR SPEC: 1.01 — SIGNIFICANT CHANGE UP (ref 1–1.03)
SQUAMOUS # UR AUTO: 1 /HPF — SIGNIFICANT CHANGE UP (ref 0–5)
UROBILINOGEN FLD QL: 0.2 MG/DL — SIGNIFICANT CHANGE UP (ref 0.2–1)
WBC # BLD: 9.65 K/UL — SIGNIFICANT CHANGE UP (ref 3.8–10.5)
WBC # FLD AUTO: 9.65 K/UL — SIGNIFICANT CHANGE UP (ref 3.8–10.5)
WBC UR QL: 5 /HPF — SIGNIFICANT CHANGE UP (ref 0–5)

## 2024-06-28 RX ORDER — KETOROLAC TROMETHAMINE 30 MG/ML
15 INJECTION, SOLUTION INTRAMUSCULAR ONCE
Refills: 0 | Status: DISCONTINUED | OUTPATIENT
Start: 2024-06-28 | End: 2024-06-28

## 2024-06-28 RX ORDER — MAGNESIUM SULFATE 100 %
2 POWDER (GRAM) MISCELLANEOUS ONCE
Refills: 0 | Status: COMPLETED | OUTPATIENT
Start: 2024-06-28 | End: 2024-06-28

## 2024-06-28 RX ADMIN — Medication 400 MILLIGRAM(S): at 02:36

## 2024-06-28 RX ADMIN — Medication 400 MILLIGRAM(S): at 16:11

## 2024-06-28 RX ADMIN — Medication 1000 MILLIGRAM(S): at 03:06

## 2024-06-28 RX ADMIN — HEPARIN SODIUM 5000 UNIT(S): 50 INJECTION, SOLUTION INTRAVENOUS at 13:56

## 2024-06-28 RX ADMIN — DEXTROSE MONOHYDRATE AND SODIUM CHLORIDE 100 MILLILITER(S): 5; .3 INJECTION, SOLUTION INTRAVENOUS at 16:11

## 2024-06-28 RX ADMIN — HYDROMORPHONE HCL 250 MILLILITER(S): 0.2 INJECTION, SOLUTION INTRAVENOUS at 19:36

## 2024-06-28 RX ADMIN — Medication 220 MILLIGRAM(S): at 13:51

## 2024-06-28 RX ADMIN — Medication 1000 MILLIGRAM(S): at 10:04

## 2024-06-28 RX ADMIN — Medication 220 MILLIGRAM(S): at 22:07

## 2024-06-28 RX ADMIN — PIPERACILLIN SODIUM AND TAZOBACTAM SODIUM 25 GRAM(S): 3; .375 INJECTION, POWDER, LYOPHILIZED, FOR SOLUTION INTRAVENOUS at 22:07

## 2024-06-28 RX ADMIN — Medication 25 GRAM(S): at 10:48

## 2024-06-28 RX ADMIN — PIPERACILLIN SODIUM AND TAZOBACTAM SODIUM 25 GRAM(S): 3; .375 INJECTION, POWDER, LYOPHILIZED, FOR SOLUTION INTRAVENOUS at 13:51

## 2024-06-28 RX ADMIN — HYDROMORPHONE HCL 250 MILLILITER(S): 0.2 INJECTION, SOLUTION INTRAVENOUS at 07:35

## 2024-06-28 RX ADMIN — Medication 1000 MILLIGRAM(S): at 16:41

## 2024-06-28 RX ADMIN — DEXTROSE MONOHYDRATE AND SODIUM CHLORIDE 100 MILLILITER(S): 5; .3 INJECTION, SOLUTION INTRAVENOUS at 07:35

## 2024-06-28 RX ADMIN — PIPERACILLIN SODIUM AND TAZOBACTAM SODIUM 25 GRAM(S): 3; .375 INJECTION, POWDER, LYOPHILIZED, FOR SOLUTION INTRAVENOUS at 06:09

## 2024-06-28 RX ADMIN — Medication 400 MILLIGRAM(S): at 09:34

## 2024-06-28 RX ADMIN — HEPARIN SODIUM 5000 UNIT(S): 50 INJECTION, SOLUTION INTRAVENOUS at 22:08

## 2024-06-28 RX ADMIN — Medication 220 MILLIGRAM(S): at 06:09

## 2024-06-28 RX ADMIN — Medication 5 MILLIGRAM(S): at 22:07

## 2024-06-28 RX ADMIN — HEPARIN SODIUM 5000 UNIT(S): 50 INJECTION, SOLUTION INTRAVENOUS at 06:10

## 2024-06-29 DIAGNOSIS — N17.9 ACUTE KIDNEY FAILURE, UNSPECIFIED: ICD-10-CM

## 2024-06-29 LAB
ANION GAP SERPL CALC-SCNC: 11 MMOL/L — SIGNIFICANT CHANGE UP (ref 5–17)
BUN SERPL-MCNC: 16 MG/DL — SIGNIFICANT CHANGE UP (ref 7–23)
CALCIUM SERPL-MCNC: 9.4 MG/DL — SIGNIFICANT CHANGE UP (ref 8.4–10.5)
CHLORIDE SERPL-SCNC: 102 MMOL/L — SIGNIFICANT CHANGE UP (ref 96–108)
CO2 SERPL-SCNC: 24 MMOL/L — SIGNIFICANT CHANGE UP (ref 22–31)
CREAT SERPL-MCNC: 1.53 MG/DL — HIGH (ref 0.5–1.3)
EGFR: 49 ML/MIN/1.73M2 — LOW
GLUCOSE SERPL-MCNC: 110 MG/DL — HIGH (ref 70–99)
HCT VFR BLD CALC: 29 % — LOW (ref 39–50)
HGB BLD-MCNC: 9.8 G/DL — LOW (ref 13–17)
MAGNESIUM SERPL-MCNC: 1.7 MG/DL — SIGNIFICANT CHANGE UP (ref 1.6–2.6)
MCHC RBC-ENTMCNC: 31.1 PG — SIGNIFICANT CHANGE UP (ref 27–34)
MCHC RBC-ENTMCNC: 33.8 GM/DL — SIGNIFICANT CHANGE UP (ref 32–36)
MCV RBC AUTO: 92.1 FL — SIGNIFICANT CHANGE UP (ref 80–100)
NRBC # BLD: 0 /100 WBCS — SIGNIFICANT CHANGE UP (ref 0–0)
PHOSPHATE SERPL-MCNC: 3.2 MG/DL — SIGNIFICANT CHANGE UP (ref 2.5–4.5)
PLATELET # BLD AUTO: 192 K/UL — SIGNIFICANT CHANGE UP (ref 150–400)
POTASSIUM SERPL-MCNC: 4.1 MMOL/L — SIGNIFICANT CHANGE UP (ref 3.5–5.3)
POTASSIUM SERPL-SCNC: 4.1 MMOL/L — SIGNIFICANT CHANGE UP (ref 3.5–5.3)
RBC # BLD: 3.15 M/UL — LOW (ref 4.2–5.8)
RBC # FLD: 12.9 % — SIGNIFICANT CHANGE UP (ref 10.3–14.5)
SODIUM SERPL-SCNC: 137 MMOL/L — SIGNIFICANT CHANGE UP (ref 135–145)
UUN UR-MCNC: 293 MG/DL — SIGNIFICANT CHANGE UP
WBC # BLD: 7.77 K/UL — SIGNIFICANT CHANGE UP (ref 3.8–10.5)
WBC # FLD AUTO: 7.77 K/UL — SIGNIFICANT CHANGE UP (ref 3.8–10.5)

## 2024-06-29 PROCEDURE — 99232 SBSQ HOSP IP/OBS MODERATE 35: CPT

## 2024-06-29 PROCEDURE — 99222 1ST HOSP IP/OBS MODERATE 55: CPT

## 2024-06-29 RX ORDER — MAGNESIUM SULFATE 100 %
2 POWDER (GRAM) MISCELLANEOUS ONCE
Refills: 0 | Status: COMPLETED | OUTPATIENT
Start: 2024-06-29 | End: 2024-06-29

## 2024-06-29 RX ADMIN — HYDROMORPHONE HCL 250 MILLILITER(S): 0.2 INJECTION, SOLUTION INTRAVENOUS at 05:36

## 2024-06-29 RX ADMIN — Medication 220 MILLIGRAM(S): at 05:58

## 2024-06-29 RX ADMIN — DEXTROSE MONOHYDRATE AND SODIUM CHLORIDE 100 MILLILITER(S): 5; .3 INJECTION, SOLUTION INTRAVENOUS at 15:01

## 2024-06-29 RX ADMIN — HEPARIN SODIUM 5000 UNIT(S): 50 INJECTION, SOLUTION INTRAVENOUS at 22:14

## 2024-06-29 RX ADMIN — HYDROMORPHONE HCL 250 MILLILITER(S): 0.2 INJECTION, SOLUTION INTRAVENOUS at 19:54

## 2024-06-29 RX ADMIN — HEPARIN SODIUM 5000 UNIT(S): 50 INJECTION, SOLUTION INTRAVENOUS at 14:05

## 2024-06-29 RX ADMIN — PIPERACILLIN SODIUM AND TAZOBACTAM SODIUM 25 GRAM(S): 3; .375 INJECTION, POWDER, LYOPHILIZED, FOR SOLUTION INTRAVENOUS at 14:05

## 2024-06-29 RX ADMIN — Medication 5 MILLIGRAM(S): at 00:27

## 2024-06-29 RX ADMIN — HEPARIN SODIUM 5000 UNIT(S): 50 INJECTION, SOLUTION INTRAVENOUS at 05:38

## 2024-06-29 RX ADMIN — Medication 25 GRAM(S): at 15:05

## 2024-06-29 RX ADMIN — HYDROMORPHONE HCL 250 MILLILITER(S): 0.2 INJECTION, SOLUTION INTRAVENOUS at 07:55

## 2024-06-29 RX ADMIN — Medication 220 MILLIGRAM(S): at 15:01

## 2024-06-29 RX ADMIN — Medication 5 MILLIGRAM(S): at 22:13

## 2024-06-29 RX ADMIN — PIPERACILLIN SODIUM AND TAZOBACTAM SODIUM 25 GRAM(S): 3; .375 INJECTION, POWDER, LYOPHILIZED, FOR SOLUTION INTRAVENOUS at 05:42

## 2024-06-30 LAB
ANION GAP SERPL CALC-SCNC: 12 MMOL/L — SIGNIFICANT CHANGE UP (ref 5–17)
APTT BLD: 28 SEC — SIGNIFICANT CHANGE UP (ref 24.5–35.6)
BUN SERPL-MCNC: 14 MG/DL — SIGNIFICANT CHANGE UP (ref 7–23)
CALCIUM SERPL-MCNC: 9.4 MG/DL — SIGNIFICANT CHANGE UP (ref 8.4–10.5)
CHLORIDE SERPL-SCNC: 103 MMOL/L — SIGNIFICANT CHANGE UP (ref 96–108)
CO2 SERPL-SCNC: 23 MMOL/L — SIGNIFICANT CHANGE UP (ref 22–31)
COLLECT DURATION TIME UR: 24 HR — SIGNIFICANT CHANGE UP
CREAT SERPL-MCNC: 1.34 MG/DL — HIGH (ref 0.5–1.3)
EGFR: 57 ML/MIN/1.73M2 — LOW
GLUCOSE SERPL-MCNC: 104 MG/DL — HIGH (ref 70–99)
HCT VFR BLD CALC: 27.9 % — LOW (ref 39–50)
HGB BLD-MCNC: 9.6 G/DL — LOW (ref 13–17)
INR BLD: 1.04 RATIO — SIGNIFICANT CHANGE UP (ref 0.85–1.18)
MAGNESIUM SERPL-MCNC: 1.8 MG/DL — SIGNIFICANT CHANGE UP (ref 1.6–2.6)
MCHC RBC-ENTMCNC: 31.8 PG — SIGNIFICANT CHANGE UP (ref 27–34)
MCHC RBC-ENTMCNC: 34.4 GM/DL — SIGNIFICANT CHANGE UP (ref 32–36)
MCV RBC AUTO: 92.4 FL — SIGNIFICANT CHANGE UP (ref 80–100)
NRBC # BLD: 0 /100 WBCS — SIGNIFICANT CHANGE UP (ref 0–0)
PHOSPHATE SERPL-MCNC: 3.4 MG/DL — SIGNIFICANT CHANGE UP (ref 2.5–4.5)
PLATELET # BLD AUTO: 222 K/UL — SIGNIFICANT CHANGE UP (ref 150–400)
POTASSIUM SERPL-MCNC: 4.3 MMOL/L — SIGNIFICANT CHANGE UP (ref 3.5–5.3)
POTASSIUM SERPL-SCNC: 4.3 MMOL/L — SIGNIFICANT CHANGE UP (ref 3.5–5.3)
PROTHROM AB SERPL-ACNC: 11.4 SEC — SIGNIFICANT CHANGE UP (ref 9.5–13)
RBC # BLD: 3.02 M/UL — LOW (ref 4.2–5.8)
RBC # FLD: 12.8 % — SIGNIFICANT CHANGE UP (ref 10.3–14.5)
SODIUM SERPL-SCNC: 138 MMOL/L — SIGNIFICANT CHANGE UP (ref 135–145)
TOTAL VOLUME - 24 HOUR: 4650 ML — SIGNIFICANT CHANGE UP
URINE CREATININE CALCULATION: 2.5 G/24 H — HIGH (ref 1–2)
UUN 24H UR-MRATE: 14.2 G/24H — SIGNIFICANT CHANGE UP (ref 6–24)
WBC # BLD: 7.18 K/UL — SIGNIFICANT CHANGE UP (ref 3.8–10.5)
WBC # FLD AUTO: 7.18 K/UL — SIGNIFICANT CHANGE UP (ref 3.8–10.5)

## 2024-06-30 RX ORDER — OXYCODONE HYDROCHLORIDE 100 MG/5ML
10 SOLUTION ORAL EVERY 6 HOURS
Refills: 0 | Status: DISCONTINUED | OUTPATIENT
Start: 2024-06-30 | End: 2024-07-01

## 2024-06-30 RX ORDER — OXYCODONE HYDROCHLORIDE 100 MG/5ML
5 SOLUTION ORAL EVERY 6 HOURS
Refills: 0 | Status: DISCONTINUED | OUTPATIENT
Start: 2024-06-30 | End: 2024-07-01

## 2024-06-30 RX ADMIN — OXYCODONE HYDROCHLORIDE 10 MILLIGRAM(S): 100 SOLUTION ORAL at 22:30

## 2024-06-30 RX ADMIN — Medication 5 MILLIGRAM(S): at 00:18

## 2024-06-30 RX ADMIN — HYDROMORPHONE HCL 250 MILLILITER(S): 0.2 INJECTION, SOLUTION INTRAVENOUS at 07:25

## 2024-06-30 RX ADMIN — OXYCODONE HYDROCHLORIDE 10 MILLIGRAM(S): 100 SOLUTION ORAL at 21:55

## 2024-06-30 RX ADMIN — OXYCODONE HYDROCHLORIDE 10 MILLIGRAM(S): 100 SOLUTION ORAL at 13:50

## 2024-06-30 RX ADMIN — Medication 5 MILLIGRAM(S): at 22:59

## 2024-06-30 RX ADMIN — HEPARIN SODIUM 5000 UNIT(S): 50 INJECTION, SOLUTION INTRAVENOUS at 21:56

## 2024-06-30 RX ADMIN — HEPARIN SODIUM 5000 UNIT(S): 50 INJECTION, SOLUTION INTRAVENOUS at 12:28

## 2024-06-30 RX ADMIN — HEPARIN SODIUM 5000 UNIT(S): 50 INJECTION, SOLUTION INTRAVENOUS at 05:58

## 2024-06-30 RX ADMIN — OXYCODONE HYDROCHLORIDE 10 MILLIGRAM(S): 100 SOLUTION ORAL at 13:20

## 2024-07-01 ENCOUNTER — TRANSCRIPTION ENCOUNTER (OUTPATIENT)
Age: 69
End: 2024-07-01

## 2024-07-01 VITALS
RESPIRATION RATE: 20 BRPM | SYSTOLIC BLOOD PRESSURE: 130 MMHG | TEMPERATURE: 99 F | OXYGEN SATURATION: 96 % | HEART RATE: 72 BPM | DIASTOLIC BLOOD PRESSURE: 70 MMHG

## 2024-07-01 LAB
ANION GAP SERPL CALC-SCNC: 11 MMOL/L — SIGNIFICANT CHANGE UP (ref 5–17)
BUN SERPL-MCNC: 13 MG/DL — SIGNIFICANT CHANGE UP (ref 7–23)
CALCIUM SERPL-MCNC: 9.6 MG/DL — SIGNIFICANT CHANGE UP (ref 8.4–10.5)
CHLORIDE SERPL-SCNC: 102 MMOL/L — SIGNIFICANT CHANGE UP (ref 96–108)
CO2 SERPL-SCNC: 24 MMOL/L — SIGNIFICANT CHANGE UP (ref 22–31)
CREAT SERPL-MCNC: 1.17 MG/DL — SIGNIFICANT CHANGE UP (ref 0.5–1.3)
EGFR: 67 ML/MIN/1.73M2 — SIGNIFICANT CHANGE UP
GLUCOSE SERPL-MCNC: 106 MG/DL — HIGH (ref 70–99)
HCT VFR BLD CALC: 28.6 % — LOW (ref 39–50)
HGB BLD-MCNC: 9.8 G/DL — LOW (ref 13–17)
MAGNESIUM SERPL-MCNC: 1.7 MG/DL — SIGNIFICANT CHANGE UP (ref 1.6–2.6)
MCHC RBC-ENTMCNC: 31.5 PG — SIGNIFICANT CHANGE UP (ref 27–34)
MCHC RBC-ENTMCNC: 34.3 GM/DL — SIGNIFICANT CHANGE UP (ref 32–36)
MCV RBC AUTO: 92 FL — SIGNIFICANT CHANGE UP (ref 80–100)
NRBC # BLD: 0 /100 WBCS — SIGNIFICANT CHANGE UP (ref 0–0)
PHOSPHATE SERPL-MCNC: 3.7 MG/DL — SIGNIFICANT CHANGE UP (ref 2.5–4.5)
PLATELET # BLD AUTO: 233 K/UL — SIGNIFICANT CHANGE UP (ref 150–400)
POTASSIUM SERPL-MCNC: 4.1 MMOL/L — SIGNIFICANT CHANGE UP (ref 3.5–5.3)
POTASSIUM SERPL-SCNC: 4.1 MMOL/L — SIGNIFICANT CHANGE UP (ref 3.5–5.3)
RBC # BLD: 3.11 M/UL — LOW (ref 4.2–5.8)
RBC # FLD: 12.6 % — SIGNIFICANT CHANGE UP (ref 10.3–14.5)
SODIUM SERPL-SCNC: 137 MMOL/L — SIGNIFICANT CHANGE UP (ref 135–145)
WBC # BLD: 6.07 K/UL — SIGNIFICANT CHANGE UP (ref 3.8–10.5)
WBC # FLD AUTO: 6.07 K/UL — SIGNIFICANT CHANGE UP (ref 3.8–10.5)

## 2024-07-01 PROCEDURE — 76770 US EXAM ABDO BACK WALL COMP: CPT

## 2024-07-01 PROCEDURE — 83935 ASSAY OF URINE OSMOLALITY: CPT

## 2024-07-01 PROCEDURE — C9399: CPT

## 2024-07-01 PROCEDURE — 85730 THROMBOPLASTIN TIME PARTIAL: CPT

## 2024-07-01 PROCEDURE — 85014 HEMATOCRIT: CPT

## 2024-07-01 PROCEDURE — 82947 ASSAY GLUCOSE BLOOD QUANT: CPT

## 2024-07-01 PROCEDURE — C1758: CPT

## 2024-07-01 PROCEDURE — 81001 URINALYSIS AUTO W/SCOPE: CPT

## 2024-07-01 PROCEDURE — 83605 ASSAY OF LACTIC ACID: CPT

## 2024-07-01 PROCEDURE — 97161 PT EVAL LOW COMPLEX 20 MIN: CPT

## 2024-07-01 PROCEDURE — 84156 ASSAY OF PROTEIN URINE: CPT

## 2024-07-01 PROCEDURE — 82330 ASSAY OF CALCIUM: CPT

## 2024-07-01 PROCEDURE — 85610 PROTHROMBIN TIME: CPT

## 2024-07-01 PROCEDURE — 85027 COMPLETE CBC AUTOMATED: CPT

## 2024-07-01 PROCEDURE — 84300 ASSAY OF URINE SODIUM: CPT

## 2024-07-01 PROCEDURE — 99231 SBSQ HOSP IP/OBS SF/LOW 25: CPT | Mod: GC

## 2024-07-01 PROCEDURE — 84295 ASSAY OF SERUM SODIUM: CPT

## 2024-07-01 PROCEDURE — C1889: CPT

## 2024-07-01 PROCEDURE — 85018 HEMOGLOBIN: CPT

## 2024-07-01 PROCEDURE — 82435 ASSAY OF BLOOD CHLORIDE: CPT

## 2024-07-01 PROCEDURE — 87086 URINE CULTURE/COLONY COUNT: CPT

## 2024-07-01 PROCEDURE — 86901 BLOOD TYPING SEROLOGIC RH(D): CPT

## 2024-07-01 PROCEDURE — 86900 BLOOD TYPING SEROLOGIC ABO: CPT

## 2024-07-01 PROCEDURE — 80048 BASIC METABOLIC PNL TOTAL CA: CPT

## 2024-07-01 PROCEDURE — 82570 ASSAY OF URINE CREATININE: CPT

## 2024-07-01 PROCEDURE — 86850 RBC ANTIBODY SCREEN: CPT

## 2024-07-01 PROCEDURE — C1769: CPT

## 2024-07-01 PROCEDURE — 36415 COLL VENOUS BLD VENIPUNCTURE: CPT

## 2024-07-01 PROCEDURE — 83735 ASSAY OF MAGNESIUM: CPT

## 2024-07-01 PROCEDURE — 84540 ASSAY OF URINE/UREA-N: CPT

## 2024-07-01 PROCEDURE — 97116 GAIT TRAINING THERAPY: CPT

## 2024-07-01 PROCEDURE — G0463: CPT

## 2024-07-01 PROCEDURE — 82803 BLOOD GASES ANY COMBINATION: CPT

## 2024-07-01 PROCEDURE — 84100 ASSAY OF PHOSPHORUS: CPT

## 2024-07-01 PROCEDURE — 87186 SC STD MICRODIL/AGAR DIL: CPT

## 2024-07-01 PROCEDURE — 88307 TISSUE EXAM BY PATHOLOGIST: CPT

## 2024-07-01 PROCEDURE — 84132 ASSAY OF SERUM POTASSIUM: CPT

## 2024-07-01 PROCEDURE — 84133 ASSAY OF URINE POTASSIUM: CPT

## 2024-07-01 RX ORDER — OXYCODONE HYDROCHLORIDE 100 MG/5ML
1 SOLUTION ORAL
Qty: 8 | Refills: 0
Start: 2024-07-01

## 2024-07-01 RX ORDER — MAGNESIUM SULFATE 100 %
2 POWDER (GRAM) MISCELLANEOUS ONCE
Refills: 0 | Status: COMPLETED | OUTPATIENT
Start: 2024-07-01 | End: 2024-07-01

## 2024-07-01 RX ADMIN — OXYCODONE HYDROCHLORIDE 10 MILLIGRAM(S): 100 SOLUTION ORAL at 07:30

## 2024-07-01 RX ADMIN — OXYCODONE HYDROCHLORIDE 10 MILLIGRAM(S): 100 SOLUTION ORAL at 08:00

## 2024-07-01 RX ADMIN — Medication 25 GRAM(S): at 11:57

## 2024-07-02 LAB — SURGICAL PATHOLOGY STUDY: SIGNIFICANT CHANGE UP

## 2024-07-09 ENCOUNTER — APPOINTMENT (OUTPATIENT)
Dept: COLORECTAL SURGERY | Facility: CLINIC | Age: 69
End: 2024-07-09

## 2024-07-17 ENCOUNTER — APPOINTMENT (OUTPATIENT)
Dept: COLORECTAL SURGERY | Facility: CLINIC | Age: 69
End: 2024-07-17
Payer: MEDICARE

## 2024-07-17 PROCEDURE — 99024 POSTOP FOLLOW-UP VISIT: CPT

## 2024-07-25 ENCOUNTER — NON-APPOINTMENT (OUTPATIENT)
Age: 69
End: 2024-07-25

## 2024-08-20 ENCOUNTER — OUTPATIENT (OUTPATIENT)
Dept: OUTPATIENT SERVICES | Facility: HOSPITAL | Age: 69
LOS: 1 days | End: 2024-08-20
Payer: MEDICARE

## 2024-08-20 ENCOUNTER — APPOINTMENT (OUTPATIENT)
Dept: COLORECTAL SURGERY | Facility: CLINIC | Age: 69
End: 2024-08-20

## 2024-08-20 ENCOUNTER — APPOINTMENT (OUTPATIENT)
Dept: RADIOLOGY | Facility: HOSPITAL | Age: 69
End: 2024-08-20

## 2024-08-20 ENCOUNTER — TRANSCRIPTION ENCOUNTER (OUTPATIENT)
Age: 69
End: 2024-08-20

## 2024-08-20 DIAGNOSIS — Z96.643 PRESENCE OF ARTIFICIAL HIP JOINT, BILATERAL: Chronic | ICD-10-CM

## 2024-08-20 DIAGNOSIS — Z98.890 OTHER SPECIFIED POSTPROCEDURAL STATES: Chronic | ICD-10-CM

## 2024-08-20 DIAGNOSIS — K91.858 OTHER COMPLICATIONS OF INTESTINAL POUCH: ICD-10-CM

## 2024-08-20 PROCEDURE — 74270 X-RAY XM COLON 1CNTRST STD: CPT

## 2024-08-20 PROCEDURE — 74270 X-RAY XM COLON 1CNTRST STD: CPT | Mod: 26

## 2024-08-21 ENCOUNTER — OUTPATIENT (OUTPATIENT)
Dept: OUTPATIENT SERVICES | Facility: HOSPITAL | Age: 69
LOS: 1 days | End: 2024-08-21
Payer: MEDICARE

## 2024-08-21 ENCOUNTER — APPOINTMENT (OUTPATIENT)
Dept: COLORECTAL SURGERY | Facility: CLINIC | Age: 69
End: 2024-08-21
Payer: MEDICARE

## 2024-08-21 ENCOUNTER — APPOINTMENT (OUTPATIENT)
Dept: MRI IMAGING | Facility: CLINIC | Age: 69
End: 2024-08-21
Payer: MEDICARE

## 2024-08-21 ENCOUNTER — NON-APPOINTMENT (OUTPATIENT)
Age: 69
End: 2024-08-21

## 2024-08-21 DIAGNOSIS — Z93.3 COLOSTOMY STATUS: Chronic | ICD-10-CM

## 2024-08-21 DIAGNOSIS — Z98.890 OTHER SPECIFIED POSTPROCEDURAL STATES: Chronic | ICD-10-CM

## 2024-08-21 DIAGNOSIS — K91.858 OTHER COMPLICATIONS OF INTESTINAL POUCH: ICD-10-CM

## 2024-08-21 DIAGNOSIS — Z96.643 PRESENCE OF ARTIFICIAL HIP JOINT, BILATERAL: Chronic | ICD-10-CM

## 2024-08-21 PROCEDURE — A9585: CPT

## 2024-08-21 PROCEDURE — 99024 POSTOP FOLLOW-UP VISIT: CPT

## 2024-08-21 PROCEDURE — 72197 MRI PELVIS W/O & W/DYE: CPT

## 2024-08-21 PROCEDURE — 72197 MRI PELVIS W/O & W/DYE: CPT | Mod: 26,MH

## 2024-08-21 NOTE — HISTORY OF PRESENT ILLNESS
[FreeTextEntry1] : The pt is a 68 y/o male with a hx of diverticulitis s/p Akua's Takedown/Ex-lap/TORIE + loop ileostomy on 06/25/24 who presents for concern of midline wound.    He states that he is doing well overall.   He states that he is concerned about "some drainage" from midline wound.   His midline wound is healing. He is experiencing drainage per wound of clear fluid. Denies foul or purulent discharge. He denies pain near his midline wound  Ostomy is functioning. Denies peristomal skin irritation.   He had GGE done yesterday and is having MRI done today.

## 2024-08-21 NOTE — PHYSICAL EXAM
[No Rash or Lesion] : No rash or lesion [Calm] : calm [de-identified] : +midline wound with small amount of drainage noted near skinfold, +stoma [de-identified] : NC/AT, [facility] [de-identified] : WDWEFE

## 2024-08-21 NOTE — ASSESSMENT
[FreeTextEntry1] : The pt is a 70 y/o male with a hx of diverticulitis s/p Akua's Takedown/Ex-lap/TORIE + loop ileostomy on 06/25/24 who presented for concern of midline wound. His midline wound is healing well despite a small amount of drainage noted in midline wound near skin fold.  -reviewed GGE results -ileostomy closure pending MRI results CB

## 2024-09-17 ENCOUNTER — APPOINTMENT (OUTPATIENT)
Dept: COLORECTAL SURGERY | Facility: CLINIC | Age: 69
End: 2024-09-17

## 2024-09-19 ENCOUNTER — APPOINTMENT (OUTPATIENT)
Dept: INTERNAL MEDICINE | Facility: CLINIC | Age: 69
End: 2024-09-19
Payer: MEDICARE

## 2024-09-19 ENCOUNTER — NON-APPOINTMENT (OUTPATIENT)
Age: 69
End: 2024-09-19

## 2024-09-19 VITALS
BODY MASS INDEX: 25.98 KG/M2 | SYSTOLIC BLOOD PRESSURE: 128 MMHG | HEIGHT: 77 IN | TEMPERATURE: 98.2 F | OXYGEN SATURATION: 100 % | RESPIRATION RATE: 16 BRPM | DIASTOLIC BLOOD PRESSURE: 76 MMHG | WEIGHT: 220 LBS | HEART RATE: 84 BPM

## 2024-09-19 VITALS
HEART RATE: 84 BPM | SYSTOLIC BLOOD PRESSURE: 128 MMHG | RESPIRATION RATE: 14 BRPM | BODY MASS INDEX: 25.98 KG/M2 | HEIGHT: 77 IN | DIASTOLIC BLOOD PRESSURE: 76 MMHG | OXYGEN SATURATION: 100 % | TEMPERATURE: 98.2 F | WEIGHT: 220 LBS

## 2024-09-19 DIAGNOSIS — Z01.818 ENCOUNTER FOR OTHER PREPROCEDURAL EXAMINATION: ICD-10-CM

## 2024-09-19 PROCEDURE — 36415 COLL VENOUS BLD VENIPUNCTURE: CPT

## 2024-09-19 PROCEDURE — 99204 OFFICE O/P NEW MOD 45 MIN: CPT

## 2024-09-19 PROCEDURE — 93000 ELECTROCARDIOGRAM COMPLETE: CPT

## 2024-09-20 LAB
ALBUMIN SERPL ELPH-MCNC: 4.5 G/DL
ALP BLD-CCNC: 89 U/L
ALT SERPL-CCNC: 21 U/L
ANION GAP SERPL CALC-SCNC: 11 MMOL/L
APTT BLD: 27.3 SEC
AST SERPL-CCNC: 20 U/L
BASOPHILS # BLD AUTO: 0.07 K/UL
BASOPHILS NFR BLD AUTO: 1.1 %
BILIRUB SERPL-MCNC: 0.7 MG/DL
BUN SERPL-MCNC: 21 MG/DL
CALCIUM SERPL-MCNC: 9.9 MG/DL
CHLORIDE SERPL-SCNC: 106 MMOL/L
CO2 SERPL-SCNC: 21 MMOL/L
CREAT SERPL-MCNC: 1.39 MG/DL
EGFR: 55 ML/MIN/1.73M2
EOSINOPHIL # BLD AUTO: 0.15 K/UL
EOSINOPHIL NFR BLD AUTO: 2.4 %
GLUCOSE SERPL-MCNC: 97 MG/DL
HCT VFR BLD CALC: 38 %
HGB BLD-MCNC: 12.8 G/DL
IMM GRANULOCYTES NFR BLD AUTO: 0.2 %
INR PPP: 1.01 RATIO
LYMPHOCYTES # BLD AUTO: 1.74 K/UL
LYMPHOCYTES NFR BLD AUTO: 27.4 %
MAN DIFF?: NORMAL
MCHC RBC-ENTMCNC: 31 PG
MCHC RBC-ENTMCNC: 33.7 GM/DL
MCV RBC AUTO: 92 FL
MONOCYTES # BLD AUTO: 0.49 K/UL
MONOCYTES NFR BLD AUTO: 7.7 %
NEUTROPHILS # BLD AUTO: 3.9 K/UL
NEUTROPHILS NFR BLD AUTO: 61.2 %
PLATELET # BLD AUTO: 278 K/UL
POTASSIUM SERPL-SCNC: 4.5 MMOL/L
PROT SERPL-MCNC: 7.7 G/DL
PT BLD: 11.4 SEC
RBC # BLD: 4.13 M/UL
RBC # FLD: 13.2 %
SODIUM SERPL-SCNC: 139 MMOL/L
WBC # FLD AUTO: 6.36 K/UL

## 2024-09-20 NOTE — ASSESSMENT
[Patient Optimized for Surgery] : Patient optimized for surgery [No Further Testing Recommended] : no further testing recommended [Continue medications as is] : Continue current medications [As per surgery] : as per surgery [FreeTextEntry4] : Patient is low risk for low risk procedure RCRI class 1, Rdz score 0% EKG done today showing NSR Labwork collected today - shows slightly elevated Cr 1.39 which can be worked up outpatient nonurgently. Patient takes Ambien and Ativan nightly (low dose) as well as atorvastatin 40mg. Can continue medications prior to procedure  He has no prior anesthesia reaction or allergies

## 2024-09-20 NOTE — HISTORY OF PRESENT ILLNESS
[No Pertinent Cardiac History] : no history of aortic stenosis, atrial fibrillation, coronary artery disease, recent myocardial infarction, or implantable device/pacemaker [No Pertinent Pulmonary History] : no history of asthma, COPD, sleep apnea, or smoking [No Adverse Anesthesia Reaction] : no adverse anesthesia reaction in self or family member [(Patient denies any chest pain, claudication, dyspnea on exertion, orthopnea, palpitations or syncope)] : Patient denies any chest pain, claudication, dyspnea on exertion, orthopnea, palpitations or syncope [Excellent (>10 METs)] : Excellent (>10 METs) [Chronic Anticoagulation] : no chronic anticoagulation [Chronic Kidney Disease] : no chronic kidney disease [Diabetes] : no diabetes [FreeTextEntry1] : Reversal of ileostomy [FreeTextEntry2] : 9/25/24 [FreeTextEntry3] : Dr. De Guzman [FreeTextEntry4] : 69M with PMHx diverticulitis s/o ileostomy creation, presents for reversal of ileostomy.  He feels well today without any acute complaints.

## 2024-09-24 PROBLEM — E78.5 HYPERLIPIDEMIA, UNSPECIFIED: Chronic | Status: INACTIVE | Noted: 2024-06-24 | Resolved: 2024-09-24

## 2024-09-24 PROBLEM — K63.1 PERFORATION OF INTESTINE (NONTRAUMATIC): Chronic | Status: INACTIVE | Noted: 2024-06-24 | Resolved: 2024-09-24

## 2024-09-24 RX ORDER — INFLUENZA VIRUS VACCINE 15; 15; 15; 15 UG/.5ML; UG/.5ML; UG/.5ML; UG/.5ML
0.5 SUSPENSION INTRAMUSCULAR ONCE
Refills: 0 | Status: DISCONTINUED | OUTPATIENT
Start: 2024-09-25 | End: 2024-09-27

## 2024-09-24 NOTE — PATIENT PROFILE ADULT - PUBLIC BENEFITS
Patient: Justo Castellano    Procedure Summary     Date:  06/26/19 Room / Location:  [facility] OR  /  [facility] OR    Anesthesia Start:  0915 Anesthesia Stop:  0953    Procedure:  excisional debridement skin and subcutaneous tissue right abdominal wall 5.1 x 2.3 x 2.4 cm (Right ) Diagnosis:       Open wound of abdominal wall, subsequent encounter      (Open wound of abdominal wall, subsequent encounter [S31.109D])    Surgeon:  Bryan Amor MD Provider:  Chad Bob CRNA    Anesthesia Type:  MAC ASA Status:  3          Anesthesia Type: MAC  Last vitals  BP   144/87 (06/26/19 1050)   Temp   97.6 °F (36.4 °C) (06/26/19 0948)   Pulse   70 (06/26/19 1050)   Resp   22 (06/26/19 1040)     SpO2   93 % (06/26/19 1050)     Post Anesthesia Care and Evaluation    Patient location during evaluation: bedside  Patient participation: complete - patient participated  Level of consciousness: awake  Pain score: 0  Pain management: adequate  Airway patency: patent  Anesthetic complications: No anesthetic complications  PONV Status: none  Cardiovascular status: acceptable  Respiratory status: acceptable  Hydration status: acceptable      
no

## 2024-09-24 NOTE — PATIENT PROFILE ADULT - NSPROMEDSBROUGHTTOHOSP_GEN_A_NUR
PROBLEMS:    Diverticulitis of intestine with perforation S/P LAP  Perihepatic collection- s/p drain  Moderate pleural effusion with atelectasis  HYPOXAMIA  ATELECTASIS  COPD    PLAN;    pulmonary stable  S/P IR drainage of intra-abdomen collection  pleural effusion sympthatic - fu conservatively  d/w colorectal surgery  AEROSOLS  SUPPORTIVE CARE  DVT PROPHYLASIX no

## 2024-09-25 ENCOUNTER — INPATIENT (INPATIENT)
Facility: HOSPITAL | Age: 69
LOS: 1 days | Discharge: HOME | DRG: 331 | End: 2024-09-27
Attending: COLON & RECTAL SURGERY | Admitting: COLON & RECTAL SURGERY
Payer: MEDICARE

## 2024-09-25 ENCOUNTER — APPOINTMENT (OUTPATIENT)
Dept: COLORECTAL SURGERY | Facility: HOSPITAL | Age: 69
End: 2024-09-25

## 2024-09-25 VITALS
WEIGHT: 223.77 LBS | TEMPERATURE: 97 F | HEART RATE: 64 BPM | DIASTOLIC BLOOD PRESSURE: 84 MMHG | HEIGHT: 77 IN | RESPIRATION RATE: 16 BRPM | SYSTOLIC BLOOD PRESSURE: 129 MMHG | OXYGEN SATURATION: 100 %

## 2024-09-25 DIAGNOSIS — Z98.890 OTHER SPECIFIED POSTPROCEDURAL STATES: Chronic | ICD-10-CM

## 2024-09-25 DIAGNOSIS — Z41.9 ENCOUNTER FOR PROCEDURE FOR PURPOSES OTHER THAN REMEDYING HEALTH STATE, UNSPECIFIED: Chronic | ICD-10-CM

## 2024-09-25 DIAGNOSIS — Z93.3 COLOSTOMY STATUS: Chronic | ICD-10-CM

## 2024-09-25 DIAGNOSIS — Z90.89 ACQUIRED ABSENCE OF OTHER ORGANS: Chronic | ICD-10-CM

## 2024-09-25 DIAGNOSIS — Z90.49 ACQUIRED ABSENCE OF OTHER SPECIFIED PARTS OF DIGESTIVE TRACT: Chronic | ICD-10-CM

## 2024-09-25 DIAGNOSIS — Z96.643 PRESENCE OF ARTIFICIAL HIP JOINT, BILATERAL: Chronic | ICD-10-CM

## 2024-09-25 LAB
BLD GP AB SCN SERPL QL: NEGATIVE — SIGNIFICANT CHANGE UP
RH IG SCN BLD-IMP: POSITIVE — SIGNIFICANT CHANGE UP

## 2024-09-25 PROCEDURE — 45330 DIAGNOSTIC SIGMOIDOSCOPY: CPT

## 2024-09-25 PROCEDURE — 44620 REPAIR BOWEL OPENING: CPT

## 2024-09-25 RX ORDER — ZOLPIDEM TARTRATE 5 MG
5 TABLET ORAL ONCE
Refills: 0 | Status: DISCONTINUED | OUTPATIENT
Start: 2024-09-25 | End: 2024-09-25

## 2024-09-25 RX ORDER — ACETAMINOPHEN 325 MG
1000 TABLET ORAL ONCE
Refills: 0 | Status: COMPLETED | OUTPATIENT
Start: 2024-09-25 | End: 2024-09-25

## 2024-09-25 RX ORDER — NALOXONE HYDROCHLORIDE 0.4 MG/ML
0.1 INJECTION, SOLUTION INTRAMUSCULAR; INTRAVENOUS; SUBCUTANEOUS
Refills: 0 | Status: DISCONTINUED | OUTPATIENT
Start: 2024-09-25 | End: 2024-09-27

## 2024-09-25 RX ORDER — ATORVASTATIN CALCIUM 10 MG/1
40 TABLET, FILM COATED ORAL AT BEDTIME
Refills: 0 | Status: DISCONTINUED | OUTPATIENT
Start: 2024-09-25 | End: 2024-09-27

## 2024-09-25 RX ORDER — GABAPENTIN 800 MG/1
300 TABLET, FILM COATED ORAL EVERY 8 HOURS
Refills: 0 | Status: DISCONTINUED | OUTPATIENT
Start: 2024-09-25 | End: 2024-09-27

## 2024-09-25 RX ORDER — ACETAMINOPHEN 325 MG
1000 TABLET ORAL EVERY 6 HOURS
Refills: 0 | Status: DISCONTINUED | OUTPATIENT
Start: 2024-09-25 | End: 2024-09-27

## 2024-09-25 RX ORDER — ONDANSETRON HCL/PF 4 MG/2 ML
4 VIAL (ML) INJECTION EVERY 6 HOURS
Refills: 0 | Status: DISCONTINUED | OUTPATIENT
Start: 2024-09-25 | End: 2024-09-27

## 2024-09-25 RX ORDER — SODIUM CHLORIDE IRRIG SOLUTION 0.9 %
1000 SOLUTION, IRRIGATION IRRIGATION
Refills: 0 | Status: DISCONTINUED | OUTPATIENT
Start: 2024-09-25 | End: 2024-09-27

## 2024-09-25 RX ORDER — ONDANSETRON HCL/PF 4 MG/2 ML
4 VIAL (ML) INJECTION EVERY 6 HOURS
Refills: 0 | Status: DISCONTINUED | OUTPATIENT
Start: 2024-09-25 | End: 2024-09-25

## 2024-09-25 RX ORDER — HYDROMORPHONE HYDROCHLORIDE 1 MG/ML
30 INJECTION, SOLUTION INTRAMUSCULAR; INTRAVENOUS; SUBCUTANEOUS
Refills: 0 | Status: DISCONTINUED | OUTPATIENT
Start: 2024-09-25 | End: 2024-09-26

## 2024-09-25 RX ADMIN — Medication 5 MILLIGRAM(S): at 23:11

## 2024-09-25 RX ADMIN — Medication 5000 UNIT(S): at 23:12

## 2024-09-25 RX ADMIN — GABAPENTIN 300 MILLIGRAM(S): 800 TABLET, FILM COATED ORAL at 23:11

## 2024-09-25 RX ADMIN — Medication 1000 MILLIGRAM(S): at 11:25

## 2024-09-25 RX ADMIN — ATORVASTATIN CALCIUM 40 MILLIGRAM(S): 10 TABLET, FILM COATED ORAL at 23:11

## 2024-09-25 RX ADMIN — HYDROMORPHONE HYDROCHLORIDE 30 MILLILITER(S): 1 INJECTION, SOLUTION INTRAMUSCULAR; INTRAVENOUS; SUBCUTANEOUS at 18:51

## 2024-09-25 NOTE — PRE-ANESTHESIA EVALUATION ADULT - NSANTHTIREDRD_ENT_A_CORE
This encounter was created for [facility] orders only  
[FreeTextEntry1] : Alert and oriented. No cognitive or communication deficits. Visual fields are full to confrontation. Optic disc margins are sharp. Pupils equal and constrict to light. Extraocular movements intact. No facial asymmetry. Hearing intact to finger rub. Palate rises symmetrically and tongue protrudes in midline. Neck is supple. No bruits heard. No weakness or sensory deficits. Tendon reflexes are all active and symmetric. Plantars are flexor. Gait and coordination intact. Heart sounds are normal. No murmurs heard.\par 
No

## 2024-09-25 NOTE — H&P ADULT - NSHPLABSRESULTS_GEN_ALL_CORE
MEDICATIONS  (PRN):      I&O's Detail              LABS:                RADIOLOGY & ADDITIONAL STUDIES:

## 2024-09-25 NOTE — PRE-ANESTHESIA EVALUATION ADULT - NSANTHOSAYNRD_GEN_A_CORE
No. FERN screening performed.  STOP BANG Legend: 0-2 = LOW Risk; 3-4 = INTERMEDIATE Risk; 5-8 = HIGH Risk

## 2024-09-25 NOTE — H&P ADULT - ASSESSMENT
68yo M, PTSD, HLD, traumatic SDH, colonic injury during lap b/l IHR requiring Hartmanns procedure (12/23) now s/p open reversal, TORIE, ureteral stent placement, w DLI creation (7/24). Presents today for reversal.     - ERAS orders in   - proceed to OR

## 2024-09-25 NOTE — PROGRESS NOTE ADULT - SUBJECTIVE AND OBJECTIVE BOX
Team 5 Surgery Post-Op Note, PCN:     Pre-Op Dx: h/o ileostomy  Procedure: Complex reversal of ileostomy      Surgeon: Herb    Subjective:  pt seen at bedside, feeling well. pain well controlled. denies nausea/vomiting    Vital Signs Last 24 Hrs  T(C): 36.2 (25 Sep 2024 14:40), Max: 36.2 (25 Sep 2024 11:03)  T(F): 97.2 (25 Sep 2024 11:03), Max: 97.2 (25 Sep 2024 11:03)  HR: 57 (25 Sep 2024 17:56) (51 - 64)  BP: 118/76 (25 Sep 2024 17:41) (113/72 - 129/84)  BP(mean): 93 (25 Sep 2024 17:41) (87 - 97)  RR: 19 (25 Sep 2024 17:56) (12 - 20)  SpO2: 100% (25 Sep 2024 17:56) (94% - 100%)    Parameters below as of 25 Sep 2024 16:56  Patient On (Oxygen Delivery Method): room air        Physical Exam:  General: NAD, resting comfortably in bed  Pulmonary: Nonlabored breathing, no respiratory distress  Cardiovascular: NSR  Abdominal: soft, nondistended, nontender, wound clean and dry with mildly saturated dressing  Extremities: WWP, normal strength  Neuro: A/O x 3, no focal deficits, normal sensation  Pulses: palpable distal pulses      LABS:            CAPILLARY BLOOD GLUCOSE            ABO Interpretation: A (09-25 @ 12:05)        Radiology and Additional Studies:    Assessment:69y Male s/p above procedure    Plan:  Pain/nausea control PRN  Home meds  Incentive spirometer/OOB/Ambulate  IVF, Diet: CLD  AM labs  Amaral

## 2024-09-25 NOTE — H&P ADULT - NSICDXPASTMEDICALHX_GEN_ALL_CORE_FT
PAST MEDICAL HISTORY:  Colostomy status placement and reversal    H/O ileostomy     H/O sepsis S/p colon resection    H/O subdural hemorrhage     Insomnia     Post traumatic stress disorder (PTSD) septic shock; prolonged hospitalization

## 2024-09-25 NOTE — H&P ADULT - HISTORY OF PRESENT ILLNESS
70yo M, PTSD, HLD, traumatic SDH, colonic injury during lap b/l IHR requiring Hartmanns procedure (12/23) now s/p open reversal, TORIE, ureteral stent placement, w DLI creation. (7/24) Presents today for ileostomy reversal. Currently denies N/V/CP/SOB, recent travel or sick contacts. Feels well.

## 2024-09-25 NOTE — BRIEF OPERATIVE NOTE - OPERATION/FINDINGS
Sharp circumferential incision + dissection of previously created ileostomy, lysis of fascial adhesions allowing for complete mobilization of small bowel, mucocutaneous edges of ostomy sharply trimmed. Enterotomy closed in interrupted fashion. Negative leak test. Hemostasis achieved. Fascia closed w interrupted PDS. Subcutaneous tissue closed w vicryl in purse string fashion.

## 2024-09-25 NOTE — CONSULT NOTE ADULT - SUBJECTIVE AND OBJECTIVE BOX
PAIN MANAGEMENT CONSULT NOTE    Chief Complaint: acute post op pain    HPI:  70yo M, PTSD, HLD, traumatic SDH, colonic injury during lap b/l IHR requiring Hartmanns procedure (12/23) now s/p open reversal, TORIE, ureteral stent placement, w DLI creation. (7/24) Presents today for ileostomy reversal. Currently denies N/V/CP/SOB, recent travel or sick contacts. Feels well.   (25 Sep 2024 13:27)      PAST MEDICAL & SURGICAL HISTORY:  Insomnia      H/O subdural hemorrhage      H/O sepsis  S/p colon resection      Colostomy status  placement and reversal      H/O ileostomy      Post traumatic stress disorder (PTSD)  septic shock; prolonged hospitalization      Status post Akua procedure      H/O hernia repair      S/P ACL repair      History of appendectomy      S/P hip replacement, bilateral      History of tonsillectomy      Elective surgery  scrotal surgery S/P sepsis      History of colon resection          FAMILY HISTORY:  FH: heart disease (Father, Mother)        SOCIAL HISTORY:  [ ] Denies Smoking, Alcohol, or Drug Use    HOME MEDICATIONS:   Please refer to initial HNP    PAIN HOME MEDICATIONS:  iSTOP review     Allergies    No Known Allergies    Intolerances        PAIN MEDICATIONS:    Heme:    Antibiotics:    Cardiovascular:    GI:    Endocrine:    All Other Medications:      Vital Signs Last 24 Hrs  T(C): 36.2 (25 Sep 2024 11:03), Max: 36.2 (25 Sep 2024 11:03)  T(F): 97.2 (25 Sep 2024 11:03), Max: 97.2 (25 Sep 2024 11:03)  HR: 64 (25 Sep 2024 11:03) (64 - 64)  BP: 129/84 (25 Sep 2024 11:03) (129/84 - 129/84)  BP(mean): --  RR: 16 (25 Sep 2024 11:03) (16 - 16)  SpO2: 100% (25 Sep 2024 11:03) (100% - 100%)        LABS:                RADIOLOGY:    Drug Screen:        REVIEW OF SYSTEMS:   MARIA LUISA, in OR      FUNCTIONAL ASSESSMENT:  PAIN SCORE AT REST:         SCALE USED: (1-10 VNRS)  PAIN SCORE WITH ACTIVITY:         SCALE USED: (1-10 VNRS)    PAIN ASSESSMENT:  MARIA LUISA, in OR    FOCUSED PHYSICAL EXAM   MARIA LUISA, in OR    ASSESSMENT:  70yo M, PTSD, HLD, traumatic SDH, colonic injury during lap b/l IHR requiring Hartmanns procedure (12/23) now s/p open reversal, TORIE, ureteral stent placement, w DLI creation (7/24). Presents today for reversal.      PLAN:    (postoperatively, as appropriate)  - start tylenol IV 1gm q8h x 24 hours then transition to 1gm PO q8h thereafter  - start gabapentin 300mg q8h  - start IV toradol 30mg q8h x 3 days if okay with surgery  - start oxycodone 5mg q4h prn pain  - start dilaudid IV 0.5mg q4h prn breakthrough  - monitor closely for oversedation, ensure narcan is ordered   - escalate bowel regimen as needed for bowel movement daily       - Bowel regimen: Senna     - Nausea ppx: Zofran as needed   - Functional Goals: Pt will get OOB with PT today. Pt will resume previous level of activity without impairment from surgery.    - Additional Consults: None recommended.    - Additional Labs/Imaging:  None recommended.      - Discharge Planning: per primary team   - Pain Management follow up plan: will continue to follow        Plan d/w Dr. Mcfadden.        Nayeli Fuentes NP   Acute Pain Service

## 2024-09-25 NOTE — CONSULT NOTE ADULT - SUBJECTIVE AND OBJECTIVE BOX
PAIN MANAGEMENT CONSULT NOTE    Chief Complaint: acute pain post op    HPI:      PAST MEDICAL & SURGICAL HISTORY:  Insomnia      H/O subdural hemorrhage      H/O sepsis  S/p colon resection      Colostomy status  placement and reversal      H/O ileostomy      Post traumatic stress disorder (PTSD)  septic shock; prolonged hospitalization      Status post Akua procedure      H/O hernia repair      S/P ACL repair      History of appendectomy      S/P hip replacement, bilateral      History of tonsillectomy      Elective surgery  scrotal surgery S/P sepsis      History of colon resection          FAMILY HISTORY:  FH: heart disease (Father, Mother)        SOCIAL HISTORY:  [ ] Denies Smoking, Alcohol, or Drug Use    HOME MEDICATIONS:   Please refer to initial HNP    PAIN HOME MEDICATIONS:  iSTOP review   A	N	O	07/01/2024	07/01/2024	oxycodone hcl (ir) 5 mg tablet	8	2	Cha Perkins	[id number]	[facility]	Y	07/08/2024	09/09/2024	ZOLPIDEM TART ER 12.5 MG TAB		30.0	30	MD HATHAWAY KELLY	Insurance	[facility]  A	Y	09/06/2024	09/06/2024	LORAZEPAM 0.5 MG TABLET		90.0	23	MD HATHAWAY KELLY	Insurance	[facility]  A	N	08/05/2024	08/05/2024	LORAZEPAM 0.5 MG TABLET		90.0	23	MD HATHAWAY KELLY	Insurance	[facility]  A	N	07/08/2024	07/31/2024	ZOLPIDEM TART ER 12.5 MG TAB		30.0	30	MD HATHAWAY KELLY	Medicare	[facility]  A	N	06/10/2024	07/03/2024	LORAZEPAM 0.5 MG TABLET		90.0	23	MD HATHAWAY KELLY	Medicare	[facility]  A	N	06/10/2024	07/03/2024	ZOLPIDEM TART ER 12.5 MG TAB		30.0	30	MD HATHAWAY KELLY	Medicare	[facility]  A	N	06/10/2024	06/10/2024	LORAZEPAM 0.5 MG TABLET		90.0	23	MD HATHAWAY KELLY	Medicare	[facility]  A	N	04/08/2024	06/05/2024	ZOLPIDEM TART ER 12.5 MG TAB		30.0	30	MD HATHAWAY KELLY	Medicare	[facility]  A	N	04/08/2024	05/10/2024	LORAZEPAM 0.5 MG TABLET		90.0	23	MD RIVAS, HERNANDEZ	Medicare	[facility]  A	N	04/08/2024	05/08/2024	ZOLPIDEM TART ER 12.5 MG TAB		30.0	30	MD HATHAWAY KELLY	Medicare	[facility]  A	N	04/08/2024	04/10/2024	ZOLPIDEM TART ER 12.5 MG TAB		30.0	30	MD RIVAS, HERNANDEZ	Medicare	[facility]  A	N	04/08/2024	04/08/2024	LORAZEPAM 0.5 MG TABLET		90.0	23	MD HATHAWAY KELLY	Medicare	[facility]  A	N	03/11/2024	03/18/2024	LORAZEPAM 0.5 MG TABLET		90.0	23	MD RIVAS, HERNANDEZ	Medicare	[facility]  A	N	03/13/2024	03/13/2024	ZOLPIDEM TART ER 12.5 MG TAB		30.0	30	MD HATHAWAY KELLY	Medicare	[facility]  A	N	02/05/2024	03/04/2024	ZOLPIDEM TARTRATE 10 MG TABLET		30.0	30	MD HATHAWAY KELLY	Medicare	[facility]  A	N	02/05/2024	02/26/2024	LORAZEPAM 0.5 MG TABLET		90.0	23	MD HATHAWAY KELLY	Medicare	[facility]  A	N	02/05/2024	02/05/2024	LORAZEPAM 0.5 MG TABLET		90.0	23	MD HATHAWAY KELLY	Medicare	[facility]  A	N	02/05/2024	02/05/2024	ZOLPIDEM TARTRATE 10 MG TABLET		30.0	30	MD HATHAWAY KELLY	Medicare	[facility]  A	N	12/11/2023	12/11/2023	TRAMADOL HCL 50 MG TABLET		15.0	5	MD MAGNUS, FRANCIE	Medicare	[facility]  A	N	11/24/2023	11/24/2023	LORAZEPAM 0.5 MG TABLET		90.0	45	MD HATHAWAY KELLY	Medicare	[facility]  A	N	11/24/2023	11/24/2023	ZOLPIDEM TARTRATE 10 MG TABLET		30.0	30	MD HATHAWAY KELLY	Medicare	[facility]  B	N	08/28/2023	10/27/2023	ZOLPIDEM TARTRATE 10 MG TABLET		30.0	30	MD RIVAS, HERNANDEZ	Medicare	[facility]  B	N	08/28/2023	10/25/2023	LORAZEPAM 0.5 MG TABLET		90.0	23	MD RIVAS, HERNANDEZ	Medicare	[facility]  B	N	08/28/2023	09/29/2023	LORAZEPAM 0.5 MG TABLET		90.0	23	MD RIVAS, HERNANDEZ	Medicare	[facility]  B	N	08/28/2023	09/28/2023	ZOLPIDEM TARTRATE 10 MG TABLET		30.0	30	MD RIVAS, HERNANDEZ	Medicare	[facility]        Allergies    No Known Allergies    Intolerances        PAIN MEDICATIONS:    Heme:    Antibiotics:    Cardiovascular:    GI:    Endocrine:    All Other Medications:      Vital Signs Last 24 Hrs  T(C): 36.2 (25 Sep 2024 11:03), Max: 36.2 (25 Sep 2024 11:03)  T(F): 97.2 (25 Sep 2024 11:03), Max: 97.2 (25 Sep 2024 11:03)  HR: 64 (25 Sep 2024 11:03) (64 - 64)  BP: 129/84 (25 Sep 2024 11:03) (129/84 - 129/84)  BP(mean): --  RR: 16 (25 Sep 2024 11:03) (16 - 16)  SpO2: 100% (25 Sep 2024 11:03) (100% - 100%)        LABS:                RADIOLOGY:    Drug Screen:        REVIEW OF SYSTEMS:   CONSTITUTIONAL: Denies fever or fatigue    EYES: Denies eye pain, visual disturbances   HEENT: Denies difficulty hearing, throat/neck pain or stiffness   RESPIRATORY: Denies SOB, cough, wheezing   CARDIOVASCULAR: Denies chest pain, palpitations.    GASTROINTESTINAL: Endorses +flatus, BMs. Denies nausea, vomiting, abdominal or epigastric pain.    GENITOURINARY: Denies dysuria, frequency, or incontinence   NEUROLOGICAL: Endorses *** numbness, tingling. Denies headaches, loss of strength, tremors, dizziness or lightheadedness with pain medications.    MUSCULOSKELETAL: Denies joint pain or swelling       FUNCTIONAL ASSESSMENT:  PAIN SCORE AT REST:         SCALE USED: (1-10 VNRS)  PAIN SCORE WITH ACTIVITY:         SCALE USED: (1-10 VNRS)    PAIN ASSESSMENT:    FOCUSED PHYSICAL EXAM   GENERAL: Laying in bed, NAD   NEURO: CN II-XII grossly intact, EOMI   PULM: unlabored   CV: Regular rate and rhythm   ABDOMEN: Soft, Nontender, Nondistended   EXTREMITIES:  2+ Peripheral Pulses, No clubbing, cyanosis, or edema   SKIN: No rashes or lesions       ASSESSMENT:                  PLAN:      -            - monitor closely for oversedation, ensure narcan is ordered   - escalate bowel regimen as needed for bowel movement daily   ***recs not yet finalized***      - Bowel regimen: Senna     - Nausea ppx: Zofran as needed   - Functional Goals: Pt will get OOB with PT today. Pt will resume previous level of activity without impairment from surgery.    - Additional Consults: None recommended.    - Additional Labs/Imaging:  None recommended.      - Discharge Planning: per primary team   - Pain Management follow up plan: will continue to follow        Plan d/w Dr. Mcfadden.        Nayeli Fuentes NP   Acute Pain Service

## 2024-09-25 NOTE — H&P ADULT - NSICDXPASTSURGICALHX_GEN_ALL_CORE_FT
PAST SURGICAL HISTORY:  Elective surgery scrotal surgery S/P sepsis    H/O hernia repair     History of appendectomy     History of colon resection     History of tonsillectomy     S/P ACL repair     S/P hip replacement, bilateral     Status post Akua procedure

## 2024-09-25 NOTE — H&P ADULT - NSHPPHYSICALEXAM_GEN_ALL_CORE
Vital Signs Last 24 Hrs  T(C): 36.2 (25 Sep 2024 11:03), Max: 36.2 (25 Sep 2024 11:03)  T(F): 97.2 (25 Sep 2024 11:03), Max: 97.2 (25 Sep 2024 11:03)  HR: 64 (25 Sep 2024 11:03) (64 - 64)  BP: 129/84 (25 Sep 2024 11:03) (129/84 - 129/84)  BP(mean): --  RR: 16 (25 Sep 2024 11:03) (16 - 16)  SpO2: 100% (25 Sep 2024 11:03) (100% - 100%)    Physical Exam:  General: NAD, resting comfortably  HEENT: NC/AT, EOMI, normal hearing, no oral lesions, no LAD, neck supple  Pulmonary: normal resp effort,   Cardiovascular: NSR,   Abdominal: soft, ND/NT, no organomegaly  Extremities: WWP, normal strength, no clubbing/cyanosis/edema  Neuro: A/O x 3, CNs II-XII grossly intact, normal sensation,

## 2024-09-26 ENCOUNTER — TRANSCRIPTION ENCOUNTER (OUTPATIENT)
Age: 69
End: 2024-09-26

## 2024-09-26 LAB
ANION GAP SERPL CALC-SCNC: 11 MMOL/L — SIGNIFICANT CHANGE UP (ref 5–17)
BUN SERPL-MCNC: 24 MG/DL — HIGH (ref 7–23)
CALCIUM SERPL-MCNC: 9.5 MG/DL — SIGNIFICANT CHANGE UP (ref 8.4–10.5)
CHLORIDE SERPL-SCNC: 104 MMOL/L — SIGNIFICANT CHANGE UP (ref 96–108)
CO2 SERPL-SCNC: 23 MMOL/L — SIGNIFICANT CHANGE UP (ref 22–31)
CREAT SERPL-MCNC: 1.19 MG/DL — SIGNIFICANT CHANGE UP (ref 0.5–1.3)
EGFR: 66 ML/MIN/1.73M2 — SIGNIFICANT CHANGE UP
GLUCOSE SERPL-MCNC: 151 MG/DL — HIGH (ref 70–99)
HCT VFR BLD CALC: 38.5 % — LOW (ref 39–50)
HGB BLD-MCNC: 13.2 G/DL — SIGNIFICANT CHANGE UP (ref 13–17)
MAGNESIUM SERPL-MCNC: 1.6 MG/DL — SIGNIFICANT CHANGE UP (ref 1.6–2.6)
MCHC RBC-ENTMCNC: 31.5 PG — SIGNIFICANT CHANGE UP (ref 27–34)
MCHC RBC-ENTMCNC: 34.3 GM/DL — SIGNIFICANT CHANGE UP (ref 32–36)
MCV RBC AUTO: 91.9 FL — SIGNIFICANT CHANGE UP (ref 80–100)
NRBC # BLD: 0 /100 WBCS — SIGNIFICANT CHANGE UP (ref 0–0)
PHOSPHATE SERPL-MCNC: 3.2 MG/DL — SIGNIFICANT CHANGE UP (ref 2.5–4.5)
PLATELET # BLD AUTO: 220 K/UL — SIGNIFICANT CHANGE UP (ref 150–400)
POTASSIUM SERPL-MCNC: 5 MMOL/L — SIGNIFICANT CHANGE UP (ref 3.5–5.3)
POTASSIUM SERPL-SCNC: 5 MMOL/L — SIGNIFICANT CHANGE UP (ref 3.5–5.3)
RBC # BLD: 4.19 M/UL — LOW (ref 4.2–5.8)
RBC # FLD: 12.8 % — SIGNIFICANT CHANGE UP (ref 10.3–14.5)
SODIUM SERPL-SCNC: 138 MMOL/L — SIGNIFICANT CHANGE UP (ref 135–145)
WBC # BLD: 14.12 K/UL — HIGH (ref 3.8–10.5)
WBC # FLD AUTO: 14.12 K/UL — HIGH (ref 3.8–10.5)

## 2024-09-26 RX ORDER — MAGNESIUM SULFATE 500 MG/ML
2 VIAL (ML) INJECTION ONCE
Refills: 0 | Status: COMPLETED | OUTPATIENT
Start: 2024-09-26 | End: 2024-09-26

## 2024-09-26 RX ADMIN — Medication 25 GRAM(S): at 09:29

## 2024-09-26 RX ADMIN — Medication 1000 MILLIGRAM(S): at 12:54

## 2024-09-26 RX ADMIN — Medication 5000 UNIT(S): at 07:02

## 2024-09-26 RX ADMIN — GABAPENTIN 300 MILLIGRAM(S): 800 TABLET, FILM COATED ORAL at 07:02

## 2024-09-26 RX ADMIN — Medication 1000 MILLIGRAM(S): at 18:45

## 2024-09-26 RX ADMIN — ATORVASTATIN CALCIUM 40 MILLIGRAM(S): 10 TABLET, FILM COATED ORAL at 22:35

## 2024-09-26 RX ADMIN — Medication 5000 UNIT(S): at 16:39

## 2024-09-26 NOTE — DISCHARGE NOTE NURSING/CASE MANAGEMENT/SOCIAL WORK - PATIENT PORTAL LINK FT
You can access the FollowMyHealth Patient Portal offered by [facility] by registering at the following website: [email or web address]. By joining "[employer]’s FollowMyHealth portal, you will also be able to view your health information using other applications (apps) compatible with our system.

## 2024-09-26 NOTE — DISCHARGE NOTE NURSING/CASE MANAGEMENT/SOCIAL WORK - NSDCPEFALRISK_GEN_ALL_CORE
For information on Fall & Injury Prevention, visit: [email or web address] OR  [email or web address] OR  https://www.cdc.gov/steadi/patient.html

## 2024-09-26 NOTE — PROGRESS NOTE ADULT - SUBJECTIVE AND OBJECTIVE BOX
SUBJECTIVE: Patient seen on morning rounds with chief resident resting comfortably in bed. Patient states he has urinary frequency. Pt is tolerating diet and pain is well controlled on current regimen. -fl/-bm. Denies n/v.        MEDICATIONS  (STANDING):  acetaminophen     Tablet .. 1000 milliGRAM(s) Oral every 6 hours  atorvastatin 40 milliGRAM(s) Oral at bedtime  gabapentin 300 milliGRAM(s) Oral every 8 hours  heparin   Injectable 5000 Unit(s) SubCutaneous every 8 hours  HYDROmorphone PCA (1 mG/mL) 30 milliLiter(s) PCA Continuous PCA Continuous  influenza  Vaccine (HIGH DOSE) 0.5 milliLiter(s) IntraMuscular once  lactated ringers. 1000 milliLiter(s) (40 mL/Hr) IV Continuous <Continuous>    MEDICATIONS  (PRN):  naloxone Injectable 0.1 milliGRAM(s) IV Push every 3 minutes PRN For ANY of the following changes in patient status:  A. RR LESS THAN 10 breaths per minute, B. Oxygen saturation LESS THAN 90%, C. Sedation score of 6  ondansetron Injectable 4 milliGRAM(s) IV Push every 6 hours PRN Nausea      Vital Signs Last 24 Hrs  T(C): 36.7 (26 Sep 2024 05:23), Max: 36.7 (26 Sep 2024 05:23)  T(F): 98.1 (26 Sep 2024 05:23), Max: 98.1 (26 Sep 2024 05:23)  HR: 60 (26 Sep 2024 05:23) (51 - 64)  BP: 129/77 (26 Sep 2024 05:23) (111/70 - 130/75)  BP(mean): 87 (25 Sep 2024 19:26) (87 - 97)  RR: 17 (26 Sep 2024 05:23) (11 - 20)  SpO2: 100% (26 Sep 2024 05:23) (94% - 100%)    Parameters below as of 26 Sep 2024 05:23  Patient On (Oxygen Delivery Method): room air        PHYSICAL EXAM:    Constitutional: A&Ox3    Respiratory: non labored breathing, no respiratory distress    Gastrointestinal: soft, nontender, nondistended. Dressing saturated with ss output.              Genitourinary: voiding    Extremities: (-) edema                  I&O's Detail    25 Sep 2024 07:01  -  26 Sep 2024 06:55  --------------------------------------------------------  IN:  Total IN: 0 mL    OUT:    Voided (mL): 325 mL  Total OUT: 325 mL    Total NET: -325 mL          LABS:                RADIOLOGY & ADDITIONAL STUDIES:

## 2024-09-26 NOTE — DISCHARGE NOTE PROVIDER - NSDCMRMEDTOKEN_GEN_ALL_CORE_FT
Ambien CR 12.5 mg oral tablet, extended release: 1 tab(s) orally once a day (at bedtime)  atorvastatin 40 mg oral tablet: 1 tab(s) orally once a day (at bedtime)  LORazepam 1 mg oral tablet: 1 tab(s) orally once a day (at bedtime)   Ambien CR 12.5 mg oral tablet, extended release: 1 tab(s) orally once a day (at bedtime)  atorvastatin 40 mg oral tablet: 1 tab(s) orally once a day (at bedtime)  LORazepam 1 mg oral tablet: 1 tab(s) orally once a day (at bedtime)  MiraLax oral powder for reconstitution: 17 gram(s) orally once a day as needed for  constipation Please take Miralax while taking oxycodone to prevent constipation  oxyCODONE 5 mg oral tablet: 1 tab(s) orally every 6 hours as needed for  severe pain MDD: 20mg

## 2024-09-26 NOTE — DISCHARGE NOTE PROVIDER - INSTRUCTIONS
Vegetables should be initially be cooked (baked, steamed, blanched, etc)  May want to start with peeled and/or canned fruit  Limit fat/oil intake and fried/greasy foods  Add small amounts of fiber back in to the diet every couple diet Please resume full liquid diet for the next couple of days.

## 2024-09-26 NOTE — DISCHARGE NOTE PROVIDER - NSDCFUADDAPPT_GEN_ALL_CORE_FT
Please schedule a follow-up appointment with Dr. Estevez within 1-2 weeks of discharge from the hospital. You may reach his office at [phone] at your earliest convenience.

## 2024-09-26 NOTE — DISCHARGE NOTE NURSING/CASE MANAGEMENT/SOCIAL WORK - NSDCFUADDAPPT_GEN_ALL_CORE_FT
Please schedule a follow-up appointment with Dr. Estevez within 1-2 weeks of discharge from the hospital. You may reach his office at [phone] at your earliest convenience.
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
## 2024-09-26 NOTE — DISCHARGE NOTE PROVIDER - NSDCCPCAREPLAN_GEN_ALL_CORE_FT
PRINCIPAL DISCHARGE DIAGNOSIS  Diagnosis: History of ileostomy  Assessment and Plan of Treatment:

## 2024-09-26 NOTE — DISCHARGE NOTE PROVIDER - CARE PROVIDER_API CALL
"  Physical Therapy Daily Treatment Note     Name: Gurpreet Youngblood  Clinic Number: [id number]    Therapy Diagnosis:   No diagnosis found.  Physician: KENY Aguirre III*    Visit Date: 7/25/2022    Evaluation Date: 5/20/2022  Authorization Period Expiration: 4/25/23  Plan of Care Expiration: 7/15/22*****  Progress Note Due: 7/15/22  Visit # / Visits authorized: 17/24  FOTO: 7/10 2nd complete 6/17/22    PTA Visit #: 1/5    Time In: 1010 (late)  Time Out: 1045  Total Treatment Time: 48 minutes  Total Billable Time: 48 minutes (3 TE)     Precautions: Standard    Subjective     Pt reports: doing ok, has about a 1 pain   He was partially compliant with home exercise program.  Response to previous treatment: no adverse effects  Functional change: ongoing    Pain: 0/10  Location: left low back       Objective     Functional Job Specific Testing:      Job Specific Task Job Demands Initial ability Current Ability Deficit?   (Yes or No)   1. Lifting suitcase  50lbs  10lb with pain 50lb with min pain 1x 40 ft, 65# deadlift yes   2. Standing  7 hrs  15 min Per FOTO: "I can stand as long as I want , but  it increases my pain." yes   3. Squatting  50x a day  15x with pain 30x 40 lbs with min pain  yes   4. Unilateral carry 50lbs  10lb with pain 40lbs x 300 ft        Palpation: TTP at left lumbar paraspinals, left transverse processes L3-5  Sensation: normal light touch      Range of Motion/Strength:      Lumbar AROM Pain/Dysfunction with Movement:   Flexion 60 mild left end range stretch    Extension 20     Right side bending 20    Left side bending 25*     Right rotation 75%     Left rotation 90%     *denotes pain with movement       L/E strength  Microfet(kg)/ MMT (*/5)  Right Left Pain/Dysfunction with Movement   Hip Flexion 18.3 kg 17.4 kg     Hip Abduction 11.3 kg 9.3 kg     Knee Flexion 12 kg 9.6 kg  double leg knee flexion 50lbs   Knee Extension 21.5 kg 16.6 kg  double leg knee ext 40lbs   Ankle DF 5/5 5/5     Ankle " "PF 5/5 5/5     Hip extension 4+/5 4+/5  back pain left    Hip internal rotation  5/5 5/5     Hip external rotation  5/5 4+/5           Joint Mobility:   - Lumbar: decreased joint mobility throughout lumbar spine. Pain with P/A greatest L3-5  -Thoracic: decreased mobility with p/a throughout thoracic spine        Gurpreet received therapeutic exercises to develop strength, endurance, ROM, flexibility, posture and core stabilization for 48 minutes including:  Prone press ups: 3x10   Thoracic ext over 1/2 foam roll; 3 min   Dead bug with ball x10 each  Open books: 10x  SL Bridges; alt 3 x 5 w/ 5" hold  Planks: 5x20"  Cybex leg press: seat 7 - 12plates 3x8  Leg extensions cybex double leg 65lbs, 4 x 8 w/ 3" hold w/ 1 min rest b/t sets  cybex hs curls 8 plates 4 x 6 w/ 1 min rest b/t sets                                                                                                                                                                                                                                                                                                                                                                                                                                                                                                                                                                                                                                                                                                                                                             Not Today:  Pallof + overhead press: 20x - blue theraband   Bird dogs: 2x10  Static lunge  2x10 12# KB    Therapeutic activities for instruction of and practice of postural awareness and back protection with daily activity and lifting techniques for 00 minutes including:  Farmer's walk: 3 laps each upper extremity - 30#   Unilateral overhead carry 10# 2 laps ea  Bag lift and carry 1x 75ft 50lbs     Not Today:  Overhead " carry 3 laps 5# theraband  Deadlifts: 35# KB left side & 30# KB right side - 8x  Suitcase lift: 35# lbs 3x10 bilateral  Hip hinge with squat: 40lbs 3x10  Goblet squats: 35# KB - 2x10      Home Exercises Provided and Patient Education Provided     Education provided:   - as noted with Therapeutic Activity  - Importance of daily home exercise program performance  Given green and blue theraband for use with home exercise program on 6/15/2022    Written Home Exercises Provided: Patient instructed to cont prior HEP.  Exercises were reviewed and Gurpreet was able to demonstrate them prior to the end of the session.  Gurpreet demonstrated good  understanding of the education provided.     See EMR under Patient Instructions for exercises provided 5/20/2022.      Assessment     Gurpreet arrived somewhat late to session. Pt presents with minimal pain in low back. Pt fatigues quickly with core exercises, some increase low back pain. Cuing required to engage core. Increased hip strengthening with dynamic movements tolerated well. Pt able to progress with leg press to increase strengthening.    Gurpreet Is progressing well towards his goals.   Pt prognosis is Good.     Pt will continue to benefit from skilled outpatient physical therapy to address the deficits listed in the problem list box on initial evaluation, provide pt/family education and to maximize pt's level of independence in the home and community environment.     Pt's spiritual, cultural and educational needs considered and pt agreeable to plan of care and goals.    Anticipated barriers to physical therapy: chronicity of current injury, objective abilities vs. self-perceived abilities and self-limiting behaviors due to pain    Goals:      Short Term Goals (3 Weeks):  1. Patient will be compliant with home exercise program to supplement therapy in promoting functional mobility. Met   2. Patient will perform 12lb deadlift with good control to demonstrate improved core/LE strength. Met  6/17/22  3. Patient will report no pain during thoracolumbar active range of motion to promote functional mobility.  4. Patient will improve impaired lower extremity hamstring and hip strength by 3kg+ to improve strength for functional tasks.     Long Term Goals (8 Weeks):   1. Patient will improve FOTO score to </= 48% limited to decrease perceived limitation with maintaining/changing body position. Met 6/17/22  2. Patient will perform floor to waist lift with 45# Kettle bell with good control to demonstrate improved core and lower extremity  Strength to perform job duties   3. Patient will be able to perform 45 mins of standing exercises to improve tolerance for standing.   4. Patient will report no pain during 50x squatting promote tolerance for job duties.  5. Pt will return to work full duty, full time.      Plan     Continue PT per plan of care - progress with core stabilization and strengthening as able.    Nolvia Barker, PT     [facility]ste[facility]  Colon/Rectal Surgery  [address] [place] [id number]  Phone: [phone]  Fax: [phone]  Follow Up Time:

## 2024-09-26 NOTE — DISCHARGE NOTE PROVIDER - HOSPITAL COURSE
70yo M, PTSD, HLD, traumatic SDH, colonic injury during lap b/l IHR requiring Hartmanns procedure (12/23) now s/p open reversal, TORIE, ureteral stent placement, w DLI creation (7/24). Now s/p DLI reversal. 70yo M, PTSD, HLD, traumatic SDH, colonic injury during lap b/l IHR requiring Akua's procedure (12/23) now s/p open reversal, TORIE, ureteral stent placement, w DLI creation (7/24). Now s/p DLI reversal. Patient's operation was uncomplicated and post op check was within normal limits. Patient's post-operative course was uncomplicated. Diet was advanced as tolerated and per return of bowel function. At time of discharge pt is tolerating diet, pain well controlled, pt is ambulating/voiding freely, having adequate bowel function. Pt is hemodynamically normal/stable and medically ready for discharge with outpatient follow-up.    **INCOMPLETE, LAST UPDATED 9/26**   68yo M, PTSD, HLD, traumatic SDH, colonic injury during lap b/l IHR requiring Akua's procedure (12/23) now s/p open reversal, TORIE, ureteral stent placement, w DLI creation (7/24). Now s/p DLI reversal. Patient's operation was uncomplicated and post op check was within normal limits. Patient's post-operative course was uncomplicated. Diet was advanced as tolerated and per return of bowel function. At time of discharge pt is tolerating diet, pain well controlled, pt is ambulating/voiding freely, having adequate bowel function. Pt is hemodynamically normal/stable and medically ready for discharge with outpatient follow-up.

## 2024-09-27 VITALS — WEIGHT: 223.11 LBS

## 2024-09-27 PROCEDURE — 83735 ASSAY OF MAGNESIUM: CPT

## 2024-09-27 PROCEDURE — 85027 COMPLETE CBC AUTOMATED: CPT

## 2024-09-27 PROCEDURE — 80048 BASIC METABOLIC PNL TOTAL CA: CPT

## 2024-09-27 PROCEDURE — 86901 BLOOD TYPING SEROLOGIC RH(D): CPT

## 2024-09-27 PROCEDURE — 86900 BLOOD TYPING SEROLOGIC ABO: CPT

## 2024-09-27 PROCEDURE — 86850 RBC ANTIBODY SCREEN: CPT

## 2024-09-27 PROCEDURE — 36415 COLL VENOUS BLD VENIPUNCTURE: CPT

## 2024-09-27 PROCEDURE — 84100 ASSAY OF PHOSPHORUS: CPT

## 2024-09-27 RX ORDER — OXYCODONE HYDROCHLORIDE 30 MG/1
1 TABLET, FILM COATED, EXTENDED RELEASE ORAL
Qty: 12 | Refills: 0
Start: 2024-09-27 | End: 2024-09-29

## 2024-09-27 RX ADMIN — Medication 1000 MILLIGRAM(S): at 08:00

## 2024-09-27 NOTE — PROGRESS NOTE ADULT - SUBJECTIVE AND OBJECTIVE BOX
SUBJECTIVE: Patient seen on morning rounds with chief resident resting comfortably in bed. Patient has no acute complaints at this time. Pt is tolerating diet and pain is well controlled on current regimen. +fl/+bm. Denies n/v.      MEDICATIONS  (STANDING):  acetaminophen     Tablet .. 1000 milliGRAM(s) Oral every 6 hours  atorvastatin 40 milliGRAM(s) Oral at bedtime  heparin   Injectable 5000 Unit(s) SubCutaneous every 8 hours  influenza  Vaccine (HIGH DOSE) 0.5 milliLiter(s) IntraMuscular once  lactated ringers. 1000 milliLiter(s) (40 mL/Hr) IV Continuous <Continuous>  LORazepam     Tablet 1 milliGRAM(s) Oral at bedtime    MEDICATIONS  (PRN):  naloxone Injectable 0.1 milliGRAM(s) IV Push every 3 minutes PRN For ANY of the following changes in patient status:  A. RR LESS THAN 10 breaths per minute, B. Oxygen saturation LESS THAN 90%, C. Sedation score of 6  ondansetron Injectable 4 milliGRAM(s) IV Push every 6 hours PRN Nausea      Vital Signs Last 24 Hrs  T(C): 36.8 (27 Sep 2024 09:09), Max: 36.9 (26 Sep 2024 15:49)  T(F): 98.3 (27 Sep 2024 09:09), Max: 98.4 (26 Sep 2024 15:49)  HR: 71 (27 Sep 2024 09:09) (58 - 80)  BP: 115/73 (27 Sep 2024 09:09) (115/73 - 130/80)  BP(mean): --  RR: 16 (27 Sep 2024 09:09) (16 - 17)  SpO2: 100% (27 Sep 2024 09:09) (98% - 100%)    Parameters below as of 27 Sep 2024 09:09  Patient On (Oxygen Delivery Method): room air        PHYSICAL EXAM:    Constitutional: A&Ox3    Respiratory: non labored breathing, no respiratory distress    Gastrointestinal: soft, nontender, nondistended. Dressing clean dry intact                   Genitourinary: voiding    Extremities: [facility]                  I&O's Detail    26 Sep 2024 07:01  -  27 Sep 2024 07:00  --------------------------------------------------------  IN:    Lactated Ringers: 760 mL    Oral Fluid: 460 mL  Total IN: 1220 mL    OUT:    Voided (mL): 2375 mL  Total OUT: 2375 mL    Total NET: -1155 mL      27 Sep 2024 07:01  -  27 Sep 2024 13:07  --------------------------------------------------------  IN:    Oral Fluid: 340 mL  Total IN: 340 mL    OUT:    Voided (mL): 400 mL  Total OUT: 400 mL    Total NET: -60 mL          LABS:                        13.2   14.12 )-----------( 220      ( 26 Sep 2024 07:40 )             38.5     09-26    138  |  104  |  24[H]  ----------------------------<  151[H]  5.0   |  23  |  1.19    Ca    9.5      26 Sep 2024 07:40  Phos  3.2     09-26  Mg     1.6     09-26        Urinalysis Basic - ( 26 Sep 2024 07:40 )    Color: x / Appearance: x / SG: x / pH: x  Gluc: 151 mg/dL / Ketone: x  / Bili: x / Urobili: x   Blood: x / Protein: x / Nitrite: x   Leuk Esterase: x / RBC: x / WBC x   Sq Epi: x / Non Sq Epi: x / Bacteria: x        RADIOLOGY & ADDITIONAL STUDIES:

## 2024-09-27 NOTE — DIETITIAN INITIAL EVALUATION ADULT - PERTINENT LABORATORY DATA
09-26    138  |  104  |  24[H]  ----------------------------<  151[H]  5.0   |  23  |  1.19    Ca    9.5      26 Sep 2024 07:40  Phos  3.2     09-26  Mg     1.6     09-26

## 2024-09-27 NOTE — DIETITIAN INITIAL EVALUATION ADULT - ADD RECOMMEND
1. Continue Low Fiber diet   2. Encourage and monitor PO intake, honor preferences as able   3. Monitor labs, wt trends, GI function, and skin integrity   4. Pain and bowel regimen per team   5. RD to remain available for additional nutrition interventions and diet edu as needed

## 2024-09-27 NOTE — DIETITIAN INITIAL EVALUATION ADULT - NUTRITIONGOAL OUTCOME1
Pt to meet at least 75% of nutritional needs consistently   Pt to recall and verbalize at least 3 nutrition education points  chronic knee, shoulder,back

## 2024-09-27 NOTE — DIETITIAN INITIAL EVALUATION ADULT - OTHER INFO
68yo M, PTSD, HLD, traumatic SDH, colonic injury during lap b/l IHR requiring Hartmanns procedure (12/23) now s/p open reversal, TORIE, ureteral stent placement, w DLI creation. (7/24) Presents today for ileostomy reversal.     Patient seen for nutrition assessment. Labs and medications reviewed. Electrolytes WNL, BUN 24<H>, glucose 151<H>. Ordered for LR @ 40mL/hr, zofran. No pressure injuries or edema documented, surgical incision to L lower abdomen. Pt denies GI distress at present, however noted with abdominal pain and discomfort per flowsheets. Current diet order: Low Fiber. Confirms NKFA. No difficulty chewing/swallowing reported. Reports fair appetite at present, consumed eggs, turkey rosales, and muffin for breakfast this AM. PTA, patient endorses good appetite and intake. Reports following a Low Fiber diet at home. Dosing weight: 223 pounds, BMI 26.5, reports UBW of 230 pounds In December 2023 prior to colonic injury. -7 pounds/3% x 9 months, not clinically significant. Observed with no overt signs and symptoms of muscle or fat wasting. Based on ASPEN guidelines, pt does not meet criteria for malnutrition. Nutrition education provided. Reviewed low fiber diet recommendations upon discharge; discussed avoiding high fiber foods such as seeds and nuts, skin of fruit and vegetables, whole grains. Provided patient with handout and outpatient dietitian contact information. Pt verbalized appreciation and understanding. See nutrition recommendations below.

## 2024-09-27 NOTE — PROGRESS NOTE ADULT - SUBJECTIVE AND OBJECTIVE BOX
PAIN MANAGEMENT CONSULT NOTE    Interval Events:  - transitioned off dPCA      Since yesterday 6am, pt has required:  - n/a      HOME MEDICATIONS:   Please refer to initial HNP    Allergies    No Known Allergies    Intolerances        PAIN MEDICATIONS:  acetaminophen     Tablet .. 1000 milliGRAM(s) Oral every 6 hours  LORazepam     Tablet 1 milliGRAM(s) Oral at bedtime  ondansetron Injectable 4 milliGRAM(s) IV Push every 6 hours PRN    Heme:  heparin   Injectable 5000 Unit(s) SubCutaneous every 8 hours    Antibiotics:    Cardiovascular:    GI:    Endocrine:  atorvastatin 40 milliGRAM(s) Oral at bedtime    All Other Medications:  influenza  Vaccine (HIGH DOSE) 0.5 milliLiter(s) IntraMuscular once  lactated ringers. 1000 milliLiter(s) IV Continuous <Continuous>  naloxone Injectable 0.1 milliGRAM(s) IV Push every 3 minutes PRN      Vital Signs Last 24 Hrs  T(C): 36.8 (27 Sep 2024 05:01), Max: 36.9 (26 Sep 2024 15:49)  T(F): 98.2 (27 Sep 2024 05:01), Max: 98.4 (26 Sep 2024 15:49)  HR: 58 (27 Sep 2024 05:01) (57 - 80)  BP: 123/75 (27 Sep 2024 05:01) (105/64 - 130/80)  BP(mean): --  RR: 17 (27 Sep 2024 05:01) (17 - 18)  SpO2: 98% (27 Sep 2024 05:01) (98% - 100%)    Parameters below as of 27 Sep 2024 05:01  Patient On (Oxygen Delivery Method): room air        LABS:                        13.2   14.12 )-----------( 220      ( 26 Sep 2024 07:40 )             38.5     09-26    138  |  104  |  24[H]  ----------------------------<  151[H]  5.0   |  23  |  1.19    Ca    9.5      26 Sep 2024 07:40  Phos  3.2     09-26  Mg     1.6     09-26        Urinalysis Basic - ( 26 Sep 2024 07:40 )    Color: x / Appearance: x / SG: x / pH: x  Gluc: 151 mg/dL / Ketone: x  / Bili: x / Urobili: x   Blood: x / Protein: x / Nitrite: x   Leuk Esterase: x / RBC: x / WBC x   Sq Epi: x / Non Sq Epi: x / Bacteria: x        RADIOLOGY:    Drug Screen:        REVIEW OF SYSTEMS:  CONSTITUTIONAL: Denies fever or fatigue   EYES: Denies eye pain, visual disturbances  HEENT: Denies difficulty hearing, throat/neck pain or stiffness  RESPIRATORY: Denies SOB, cough, wheezing  CARDIOVASCULAR: Denies chest pain, palpitations.   GASTROINTESTINAL: Endorses +flatus, BMs. Denies nausea, vomiting, abdominal or epigastric pain.   GENITOURINARY: Denies dysuria, frequency, or incontinence  NEUROLOGICAL: Endorses *** numbness, tingling. Denies headaches, loss of strength, tremors, dizziness or lightheadedness with pain medications.   MUSCULOSKELETAL: Denies joint pain or swelling      FUNCTIONAL ASSESSMENT:  PAIN SCORE AT REST:         SCALE USED: (1-10 VNRS)  PAIN SCORE WITH ACTIVITY:         SCALE USED: (1-10 VNRS)    PAIN ASSESSMENT:    FOCUSED PHYSICAL EXAM  GENERAL: Laying in bed, NAD  NEURO: CN II-XII grossly intact, EOMI  PULM: unlabored  CV: Regular rate and rhythm  ABDOMEN: Soft, Nontender, Nondistended  EXTREMITIES:  2+ Peripheral Pulses, No clubbing, cyanosis, or edema  SKIN: No rashes or lesions      ASSESSMENT: 68yo M, PTSD, HLD, traumatic SDH, colonic injury during lap b/l IHR requiring Hartmanns procedure (12/23) now s/p open reversal, TORIE, ureteral stent placement, w DLI creation (7/24). Now s/p DLI reversal (9/25).      PLAN:   - adjust tylenol to 1gm q8h  - dc gabapentin, can restart if needed but pt wanted to trial off   - monitor closely for oversedation, ensure narcan is ordered  - escalate bowel regimen as needed for bowel movement daily  ***recs not yet finalized***    - monitor closely for oversedation, ensure narcan is ordered  - escalate bowel regimen as needed for bowel movement daily    - Bowel regimen: per surgery    - Nausea ppx: Zofran as needed  - Functional Goals: Pt will get OOB with PT today. Pt will resume previous level of activity without impairment from surgery.   - Additional Consults: None recommended.   - Additional Labs/Imaging:  None recommended.     - Discharge Planning: per primary team  - Pain Management follow up plan: will continue to follow    Plan d/w Dr. Mcfadden.     Nayeli Fuentes NP  Acute Pain Service PAIN MANAGEMENT CONSULT NOTE    Interval Events:  - transitioned off dPCA  - pain controlled, dispo pending    Since yesterday 6am, pt has required:  - n/a      HOME MEDICATIONS:   Please refer to initial HNP    Allergies    No Known Allergies    Intolerances        PAIN MEDICATIONS:  acetaminophen     Tablet .. 1000 milliGRAM(s) Oral every 6 hours  LORazepam     Tablet 1 milliGRAM(s) Oral at bedtime  ondansetron Injectable 4 milliGRAM(s) IV Push every 6 hours PRN    Heme:  heparin   Injectable 5000 Unit(s) SubCutaneous every 8 hours    Antibiotics:    Cardiovascular:    GI:    Endocrine:  atorvastatin 40 milliGRAM(s) Oral at bedtime    All Other Medications:  influenza  Vaccine (HIGH DOSE) 0.5 milliLiter(s) IntraMuscular once  lactated ringers. 1000 milliLiter(s) IV Continuous <Continuous>  naloxone Injectable 0.1 milliGRAM(s) IV Push every 3 minutes PRN      Vital Signs Last 24 Hrs  T(C): 36.8 (27 Sep 2024 05:01), Max: 36.9 (26 Sep 2024 15:49)  T(F): 98.2 (27 Sep 2024 05:01), Max: 98.4 (26 Sep 2024 15:49)  HR: 58 (27 Sep 2024 05:01) (57 - 80)  BP: 123/75 (27 Sep 2024 05:01) (105/64 - 130/80)  BP(mean): --  RR: 17 (27 Sep 2024 05:01) (17 - 18)  SpO2: 98% (27 Sep 2024 05:01) (98% - 100%)    Parameters below as of 27 Sep 2024 05:01  Patient On (Oxygen Delivery Method): room air        LABS:                        13.2   14.12 )-----------( 220      ( 26 Sep 2024 07:40 )             38.5     09-26    138  |  104  |  24[H]  ----------------------------<  151[H]  5.0   |  23  |  1.19    Ca    9.5      26 Sep 2024 07:40  Phos  3.2     09-26  Mg     1.6     09-26        Urinalysis Basic - ( 26 Sep 2024 07:40 )    Color: x / Appearance: x / SG: x / pH: x  Gluc: 151 mg/dL / Ketone: x  / Bili: x / Urobili: x   Blood: x / Protein: x / Nitrite: x   Leuk Esterase: x / RBC: x / WBC x   Sq Epi: x / Non Sq Epi: x / Bacteria: x        RADIOLOGY:    Drug Screen:        REVIEW OF SYSTEMS:  CONSTITUTIONAL: Denies fever or fatigue   EYES: Denies eye pain, visual disturbances  HEENT: Denies difficulty hearing, throat/neck pain or stiffness  RESPIRATORY: Denies SOB, cough, wheezing  CARDIOVASCULAR: Denies chest pain, palpitations.   GASTROINTESTINAL: Endorses +flatus, BMs. Denies nausea, vomiting, abdominal or epigastric pain.   GENITOURINARY: Denies dysuria, frequency, or incontinence  NEUROLOGICAL: Denies numbness, tingling. Denies headaches, loss of strength, tremors, dizziness or lightheadedness with pain medications.   MUSCULOSKELETAL: Denies joint pain or swelling      FUNCTIONAL ASSESSMENT:  PAIN SCORE AT REST:         SCALE USED: (1-10 VNRS)  PAIN SCORE WITH ACTIVITY:         SCALE USED: (1-10 VNRS)    PAIN ASSESSMENT:  - denies pain    FOCUSED PHYSICAL EXAM  GENERAL: Laying in bed, NAD  NEURO: CN II-XII grossly intact, EOMI  PULM: unlabored  CV: Regular rate and rhythm  ABDOMEN: Soft, Nondistended  EXTREMITIES:  2+ Peripheral Pulses, No clubbing, cyanosis, or edema  SKIN: No rashes or lesions      ASSESSMENT: 68yo M, PTSD, HLD, traumatic SDH, colonic injury during lap b/l IHR requiring Hartmanns procedure (12/23) now s/p open reversal, TORIE, ureteral stent placement, w DLI creation (7/24). Now s/p DLI reversal (9/25).      PLAN:   - adjust tylenol to 1gm q8h  - dc gabapentin  - monitor closely for oversedation, ensure narcan is ordered  - escalate bowel regimen as needed for bowel movement daily    - Bowel regimen: per surgery    - Nausea ppx: Zofran as needed  - Functional Goals: Pt will get OOB with PT today. Pt will resume previous level of activity without impairment from surgery.   - Additional Consults: None recommended.   - Additional Labs/Imaging:  None recommended.     - Discharge Planning: per primary team  - Pain Management follow up plan: will continue to follow    Plan d/w Dr. Mcfadden.     Nayeli Fuentes NP  Acute Pain Service

## 2024-09-27 NOTE — PROGRESS NOTE ADULT - ASSESSMENT
70yo M, PTSD, HLD, traumatic SDH, colonic injury during lap b/l IHR requiring Hartmanns procedure (12/23) now s/p open reversal, TORIE, ureteral stent placement, w DLI creation (7/24). Now s/p DLI reversal (9/25)    LRD/IVF at 39   Pain + nausea control   IS/OOBA   SCD/SQH  Pain management recs  Dc today to home
68yo M, PTSD, HLD, traumatic SDH, colonic injury during lap b/l IHR requiring Hartmanns procedure (12/23) now s/p open reversal, TORIE, ureteral stent placement, w DLI creation (7/24). Now s/p DLI reversal.     CLD/IVF at 39   Pain + nausea control   IS/OOBA   SCD/SQH   AM Labs

## 2024-09-27 NOTE — DIETITIAN INITIAL EVALUATION ADULT - PERTINENT MEDS FT
MEDICATIONS  (STANDING):  acetaminophen     Tablet .. 1000 milliGRAM(s) Oral every 6 hours  atorvastatin 40 milliGRAM(s) Oral at bedtime  heparin   Injectable 5000 Unit(s) SubCutaneous every 8 hours  influenza  Vaccine (HIGH DOSE) 0.5 milliLiter(s) IntraMuscular once  lactated ringers. 1000 milliLiter(s) (40 mL/Hr) IV Continuous <Continuous>  LORazepam     Tablet 1 milliGRAM(s) Oral at bedtime    MEDICATIONS  (PRN):  naloxone Injectable 0.1 milliGRAM(s) IV Push every 3 minutes PRN For ANY of the following changes in patient status:  A. RR LESS THAN 10 breaths per minute, B. Oxygen saturation LESS THAN 90%, C. Sedation score of 6  ondansetron Injectable 4 milliGRAM(s) IV Push every 6 hours PRN Nausea

## 2024-09-27 NOTE — DIETITIAN INITIAL EVALUATION ADULT - OTHER CALCULATIONS
pounds, %%  Pt within % IBW thus actual body weight used for all calculations. Needs adjusted for advanced age, post-op healing.

## 2024-09-30 PROBLEM — Z86.79 PERSONAL HISTORY OF OTHER DISEASES OF THE CIRCULATORY SYSTEM: Chronic | Status: ACTIVE | Noted: 2024-09-24

## 2024-09-30 PROBLEM — Z98.890 OTHER SPECIFIED POSTPROCEDURAL STATES: Chronic | Status: ACTIVE | Noted: 2024-09-24

## 2024-09-30 PROBLEM — Z93.3 COLOSTOMY STATUS: Chronic | Status: ACTIVE | Noted: 2024-09-24

## 2024-09-30 PROBLEM — F43.10 POST-TRAUMATIC STRESS DISORDER, UNSPECIFIED: Chronic | Status: ACTIVE | Noted: 2024-09-25

## 2024-09-30 PROBLEM — Z86.19 PERSONAL HISTORY OF OTHER INFECTIOUS AND PARASITIC DISEASES: Chronic | Status: ACTIVE | Noted: 2024-09-24

## 2024-10-01 DIAGNOSIS — Z98.890 OTHER SPECIFIED POSTPROCEDURAL STATES: ICD-10-CM

## 2024-10-01 RX ORDER — OXYCODONE 5 MG/1
5 TABLET ORAL EVERY 6 HOURS
Qty: 8 | Refills: 0 | Status: ACTIVE | COMMUNITY
Start: 2024-10-01 | End: 1900-01-01

## 2024-10-08 DIAGNOSIS — Z43.2 ENCOUNTER FOR ATTENTION TO ILEOSTOMY: ICD-10-CM

## 2024-10-08 DIAGNOSIS — E78.5 HYPERLIPIDEMIA, UNSPECIFIED: ICD-10-CM

## 2024-10-09 ENCOUNTER — APPOINTMENT (OUTPATIENT)
Dept: COLORECTAL SURGERY | Facility: CLINIC | Age: 69
End: 2024-10-09

## 2024-10-09 PROCEDURE — 99024 POSTOP FOLLOW-UP VISIT: CPT

## 2024-10-21 ENCOUNTER — APPOINTMENT (OUTPATIENT)
Dept: COLORECTAL SURGERY | Facility: CLINIC | Age: 69
End: 2024-10-21

## (undated) DEVICE — DRAPE IOBAN 13" X 13"

## (undated) DEVICE — SYR ASEPTO

## (undated) DEVICE — Device

## (undated) DEVICE — TROCAR COVIDIEN VERSAPORT BLADELESS OPTICAL 12MM STANDARD

## (undated) DEVICE — SOL IRR BAG H2O 3000ML

## (undated) DEVICE — TUBING CAP SET ERBEFLO CLEVERCAP HYBRID CO2 FOR OLYMPUS SCOPES AND UCR

## (undated) DEVICE — POSITIONER FOAM EGG CRATE ULNAR 2PCS (PINK)

## (undated) DEVICE — NEPTUNE II 4-PORT MANIFOLD

## (undated) DEVICE — SOL IRR POUR H2O 250ML

## (undated) DEVICE — GLV 8 PROTEXIS (WHITE)

## (undated) DEVICE — SUT VICRYL 2-0 27" SH UNDYED

## (undated) DEVICE — DRSG DERMABOND 0.7ML

## (undated) DEVICE — PREP CHLORAPREP HI-LITE ORANGE 26ML

## (undated) DEVICE — SUCTION YANKAUER OPEN TIP NO VENT CURVE

## (undated) DEVICE — SOL IRR POUR NS 0.9% 500ML

## (undated) DEVICE — KIT ENDO PROCEDURE CUST W/VLV

## (undated) DEVICE — ELCTR BOVIE PENCIL HANDPIECE ROCKER SWITCH 15FT

## (undated) DEVICE — TUBING PLUME AWAY 4.0

## (undated) DEVICE — TUBING STRYKEFLOW II SUCTION / IRRIGATOR

## (undated) DEVICE — PACK CYSTO

## (undated) DEVICE — LIGASURE BLUNT TIP 37CM

## (undated) DEVICE — POSITIONER PURPLE ARM ONE STEP (LARGE)

## (undated) DEVICE — VENODYNE/SCD SLEEVE CALF MEDIUM

## (undated) DEVICE — PREP BETADINE KIT

## (undated) DEVICE — TROCAR COVIDIEN VERSAPORT BLADELESS OPTICAL 5MM STANDARD

## (undated) DEVICE — DRAPE GENERAL ENDOSCOPY

## (undated) DEVICE — ACMI SELF-SEALING SEAL UP TO 7FR

## (undated) DEVICE — WARMING BLANKET FULL ADULT

## (undated) DEVICE — TROCAR APPLIED MEDICAL KII BALLOON BLUNT TIP 12MM X 100MM

## (undated) DEVICE — LIGASURE IMPACT

## (undated) DEVICE — TUBING LAPAROSCOPIC 3/8INX10FT

## (undated) DEVICE — SUT POLYSORB 3-0 30" V-20 UNDYED

## (undated) DEVICE — POSITIONER FOAM HEADREST (PINK)

## (undated) DEVICE — SUT POLYSORB 0 36" GU-46

## (undated) DEVICE — SUT MONOCRYL 4-0 18" PS-2

## (undated) DEVICE — GOWN TRIMAX LG

## (undated) DEVICE — OSTOMY LOOP ROD EYELET BOTH END 2"

## (undated) DEVICE — EPIDURAL KIT DURAFLEX CONTINUOUS FIXED 17G

## (undated) DEVICE — SPECIMEN CONTAINER 100ML

## (undated) DEVICE — D HELP - CLEARVIEW CLEARIFY SYSTEM

## (undated) DEVICE — PACK BASIN SPECIAL PROCEDURE

## (undated) DEVICE — ELCTR BOVIE TIP BLADE VALLEYLAB 6.5"

## (undated) DEVICE — ADAPTER URETHRAL CATH CONNECTING

## (undated) DEVICE — FOLEY TRAY 16FR 5CC LF LUBRISIL ADVANCE TEMP CLOSED

## (undated) DEVICE — POSITIONER PINK PAD PIGAZZI SYSTEM FULL KIT

## (undated) DEVICE — SUT CHROMIC 3-0 30" V-20

## (undated) DEVICE — DRAPE TOWEL BLUE 17" X 24"

## (undated) DEVICE — POSITIONER PINK PAD PIGAZZI SYSTEM

## (undated) DEVICE — TUBING THERMADX UROLOGY

## (undated) DEVICE — WARMING BLANKET UPPER ADULT

## (undated) DEVICE — OSTOMY KIT 2-PIECE 2.75" NS (BLUE)

## (undated) DEVICE — SOL IRR BAG NS 0.9% 3000ML

## (undated) DEVICE — DRAPE LEGGINGS XL

## (undated) DEVICE — NDL HYPO SAFE 22G X 1.5" (BLACK)

## (undated) DEVICE — PACK MAJOR ABDOMINAL SUPINE

## (undated) DEVICE — TUBING STRYKER PNEUMOCLEAR SMOKE HEAT HUMID

## (undated) DEVICE — OSTOMY KIT 2-PIECE 2.25" NS (RED)

## (undated) DEVICE — TUBING ERBE CO2 OLYMPUS CONNECTOR

## (undated) DEVICE — SUT VICRYL PLUS 3-0 27" SH

## (undated) DEVICE — TUBING TUR 2 PRONG

## (undated) DEVICE — DRAPE LINGEMAN TUR

## (undated) DEVICE — TUBING SMOKE EVACUATOR 7/8 X 10FT

## (undated) DEVICE — SUT VICRYL PLUS 2-0 27" SH

## (undated) DEVICE — FOLEY CATH 2-WAY 18FR 5CC LATEX COUDE RED

## (undated) DEVICE — FOLEY TRAY 16FR 5CC LTX UMETER CLOSED

## (undated) DEVICE — TUBING HYBRID CO2